# Patient Record
Sex: FEMALE | Race: BLACK OR AFRICAN AMERICAN | NOT HISPANIC OR LATINO | URBAN - METROPOLITAN AREA
[De-identification: names, ages, dates, MRNs, and addresses within clinical notes are randomized per-mention and may not be internally consistent; named-entity substitution may affect disease eponyms.]

---

## 2017-04-10 ENCOUNTER — INPATIENT (INPATIENT)
Facility: HOSPITAL | Age: 67
LOS: 1 days | Discharge: HOME | End: 2017-04-12
Attending: INTERNAL MEDICINE | Admitting: INTERNAL MEDICINE
Payer: MEDICAID

## 2017-04-10 VITALS
HEIGHT: 64 IN | TEMPERATURE: 97 F | HEART RATE: 87 BPM | RESPIRATION RATE: 17 BRPM | OXYGEN SATURATION: 100 % | SYSTOLIC BLOOD PRESSURE: 197 MMHG | WEIGHT: 216.05 LBS | DIASTOLIC BLOOD PRESSURE: 119 MMHG

## 2017-04-10 LAB
ALBUMIN SERPL ELPH-MCNC: 3.6 G/DL — SIGNIFICANT CHANGE UP (ref 3.3–5)
ALP SERPL-CCNC: 70 U/L — SIGNIFICANT CHANGE UP (ref 40–120)
ALT FLD-CCNC: 19 U/L — SIGNIFICANT CHANGE UP (ref 12–78)
ANION GAP SERPL CALC-SCNC: 8 MMOL/L — SIGNIFICANT CHANGE UP (ref 5–17)
APTT BLD: 33.7 SEC — SIGNIFICANT CHANGE UP (ref 27.5–37.4)
AST SERPL-CCNC: 19 U/L — SIGNIFICANT CHANGE UP (ref 15–37)
BASOPHILS # BLD AUTO: 0.1 K/UL — SIGNIFICANT CHANGE UP (ref 0–0.2)
BASOPHILS NFR BLD AUTO: 1 % — SIGNIFICANT CHANGE UP (ref 0–2)
BILIRUB SERPL-MCNC: 0.5 MG/DL — SIGNIFICANT CHANGE UP (ref 0.2–1.2)
BUN SERPL-MCNC: 12 MG/DL — SIGNIFICANT CHANGE UP (ref 7–23)
CALCIUM SERPL-MCNC: 8.1 MG/DL — LOW (ref 8.5–10.1)
CHLORIDE SERPL-SCNC: 102 MMOL/L — SIGNIFICANT CHANGE UP (ref 96–108)
CK MB CFR SERPL CALC: 2 NG/ML — SIGNIFICANT CHANGE UP (ref 0.5–3.6)
CK SERPL-CCNC: 147 U/L — SIGNIFICANT CHANGE UP (ref 26–192)
CO2 SERPL-SCNC: 30 MMOL/L — SIGNIFICANT CHANGE UP (ref 22–31)
CREAT SERPL-MCNC: 0.69 MG/DL — SIGNIFICANT CHANGE UP (ref 0.5–1.3)
EOSINOPHIL # BLD AUTO: 0 K/UL — SIGNIFICANT CHANGE UP (ref 0–0.5)
EOSINOPHIL NFR BLD AUTO: 0.6 % — SIGNIFICANT CHANGE UP (ref 0–6)
GLUCOSE SERPL-MCNC: 112 MG/DL — HIGH (ref 70–99)
HCT VFR BLD CALC: 40.6 % — SIGNIFICANT CHANGE UP (ref 34.5–45)
HGB BLD-MCNC: 13.1 G/DL — SIGNIFICANT CHANGE UP (ref 11.5–15.5)
INR BLD: 1.04 RATIO — SIGNIFICANT CHANGE UP (ref 0.88–1.16)
LYMPHOCYTES # BLD AUTO: 1.4 K/UL — SIGNIFICANT CHANGE UP (ref 1–3.3)
LYMPHOCYTES # BLD AUTO: 26.7 % — SIGNIFICANT CHANGE UP (ref 13–44)
MCHC RBC-ENTMCNC: 27.7 PG — SIGNIFICANT CHANGE UP (ref 27–34)
MCHC RBC-ENTMCNC: 32.1 GM/DL — SIGNIFICANT CHANGE UP (ref 32–36)
MCV RBC AUTO: 86.2 FL — SIGNIFICANT CHANGE UP (ref 80–100)
MONOCYTES # BLD AUTO: 0.3 K/UL — SIGNIFICANT CHANGE UP (ref 0–0.9)
MONOCYTES NFR BLD AUTO: 5.2 % — SIGNIFICANT CHANGE UP (ref 2–14)
NEUTROPHILS # BLD AUTO: 3.6 K/UL — SIGNIFICANT CHANGE UP (ref 1.8–7.4)
NEUTROPHILS NFR BLD AUTO: 66.5 % — SIGNIFICANT CHANGE UP (ref 43–77)
PLATELET # BLD AUTO: 225 K/UL — SIGNIFICANT CHANGE UP (ref 150–400)
POTASSIUM SERPL-MCNC: 3.9 MMOL/L — SIGNIFICANT CHANGE UP (ref 3.5–5.3)
POTASSIUM SERPL-SCNC: 3.9 MMOL/L — SIGNIFICANT CHANGE UP (ref 3.5–5.3)
PROT SERPL-MCNC: 7.4 GM/DL — SIGNIFICANT CHANGE UP (ref 6–8.3)
PROTHROM AB SERPL-ACNC: 11.3 SEC — SIGNIFICANT CHANGE UP (ref 9.8–12.7)
RBC # BLD: 4.71 M/UL — SIGNIFICANT CHANGE UP (ref 3.8–5.2)
RBC # FLD: 14 % — SIGNIFICANT CHANGE UP (ref 11–15)
SODIUM SERPL-SCNC: 140 MMOL/L — SIGNIFICANT CHANGE UP (ref 135–145)
TROPONIN I SERPL-MCNC: <.015 NG/ML — SIGNIFICANT CHANGE UP (ref 0.01–0.04)
WBC # BLD: 5.4 K/UL — SIGNIFICANT CHANGE UP (ref 3.8–10.5)
WBC # FLD AUTO: 5.4 K/UL — SIGNIFICANT CHANGE UP (ref 3.8–10.5)

## 2017-04-10 PROCEDURE — 71010: CPT | Mod: 26

## 2017-04-10 PROCEDURE — 99285 EMERGENCY DEPT VISIT HI MDM: CPT

## 2017-04-10 PROCEDURE — 74174 CTA ABD&PLVS W/CONTRAST: CPT | Mod: 26

## 2017-04-10 PROCEDURE — 70450 CT HEAD/BRAIN W/O DYE: CPT | Mod: 26

## 2017-04-10 RX ORDER — LABETALOL HCL 100 MG
10 TABLET ORAL ONCE
Qty: 0 | Refills: 0 | Status: COMPLETED | OUTPATIENT
Start: 2017-04-10 | End: 2017-04-10

## 2017-04-10 RX ORDER — ASPIRIN/CALCIUM CARB/MAGNESIUM 324 MG
325 TABLET ORAL ONCE
Qty: 0 | Refills: 0 | Status: COMPLETED | OUTPATIENT
Start: 2017-04-10 | End: 2017-04-10

## 2017-04-10 RX ADMIN — Medication 10 MILLIGRAM(S): at 22:25

## 2017-04-10 RX ADMIN — Medication 325 MILLIGRAM(S): at 22:25

## 2017-04-10 RX ADMIN — Medication 1 MILLIGRAM(S): at 22:25

## 2017-04-10 NOTE — ED ADULT NURSE NOTE - OBJECTIVE STATEMENT
pt is AxOx4; c/o chest pain and occasional difficulty breathing. her cousin passed away and pt 's got very upset. pt also has mild HA. denies any other symptoms

## 2017-04-10 NOTE — ED PROVIDER NOTE - MEDICAL DECISION MAKING DETAILS
Patient with chest pain, intermittent for the last week in the setting of recent stress.  BP improved in the ER.  Chest pain improved as well.  CXR, CTA studies are negative.  Based on risk factors, Heart Score 3, patient is to be admitted for ACS r/o.  Aspirin and labetalol given in ER.  Needs cards consult in am.  Patient is to be admitted to the hospital and the case was discussed with the admitting physician.  Any changes in plan, additional imaging/labs, and further work up will be at the discretion of the admitting physician.  Patient remained stable in my care.

## 2017-04-10 NOTE — ED PROVIDER NOTE - OBJECTIVE STATEMENT
Pertinent PMH/PSH/FHx/SHx and Review of Systems contained within:  Patient with history of HTN, HLD, and previous TIA's presents to the ED for chest pain.  Patient has been having intermittent chest pain for the last week after finding out that her cousin was ill. Today chest pain worsened after cousin passed away, "feels like someone is squeezing my neck" and has pain in jaw, "like I have a bad toothache".  Also complains of shortness of breath.  BP at home has been elevated.  Nonsmoker, no previous CAD.  Denies any calf pain or leg swelling.  Patient is limited historian, keeps eyes closed, and displaying distress.  AAOx3, normal insight, no S/H TIP, no command hallucinations.  Patient denies EtOH/tobacco/illicit substance use.      No fever/chills, No photophobia/eye pain/changes in vision, No ear pain/sore throat/dysphagia, No palpitations, no cough/wheeze/stridor, No abdominal pain, No N/V/D, no dysuria/frequency/discharge, No neck/back pain, no rash, no changes in neurological status/function.

## 2017-04-11 DIAGNOSIS — E03.9 HYPOTHYROIDISM, UNSPECIFIED: ICD-10-CM

## 2017-04-11 DIAGNOSIS — I10 ESSENTIAL (PRIMARY) HYPERTENSION: ICD-10-CM

## 2017-04-11 DIAGNOSIS — R07.9 CHEST PAIN, UNSPECIFIED: ICD-10-CM

## 2017-04-11 LAB
ANION GAP SERPL CALC-SCNC: 8 MMOL/L — SIGNIFICANT CHANGE UP (ref 5–17)
BASOPHILS # BLD AUTO: 0.1 K/UL — SIGNIFICANT CHANGE UP (ref 0–0.2)
BASOPHILS NFR BLD AUTO: 1.1 % — SIGNIFICANT CHANGE UP (ref 0–2)
BUN SERPL-MCNC: 14 MG/DL — SIGNIFICANT CHANGE UP (ref 7–23)
CALCIUM SERPL-MCNC: 8.3 MG/DL — LOW (ref 8.5–10.1)
CHLORIDE SERPL-SCNC: 106 MMOL/L — SIGNIFICANT CHANGE UP (ref 96–108)
CHOLEST SERPL-MCNC: 139 MG/DL — SIGNIFICANT CHANGE UP (ref 10–199)
CO2 SERPL-SCNC: 28 MMOL/L — SIGNIFICANT CHANGE UP (ref 22–31)
CREAT SERPL-MCNC: 0.73 MG/DL — SIGNIFICANT CHANGE UP (ref 0.5–1.3)
EOSINOPHIL # BLD AUTO: 0.1 K/UL — SIGNIFICANT CHANGE UP (ref 0–0.5)
EOSINOPHIL NFR BLD AUTO: 1.7 % — SIGNIFICANT CHANGE UP (ref 0–6)
GLUCOSE SERPL-MCNC: 96 MG/DL — SIGNIFICANT CHANGE UP (ref 70–99)
HBA1C BLD-MCNC: 5.3 % — SIGNIFICANT CHANGE UP (ref 4–5.6)
HCT VFR BLD CALC: 38.7 % — SIGNIFICANT CHANGE UP (ref 34.5–45)
HDLC SERPL-MCNC: 51 MG/DL — SIGNIFICANT CHANGE UP (ref 40–125)
HGB BLD-MCNC: 12.3 G/DL — SIGNIFICANT CHANGE UP (ref 11.5–15.5)
LIPID PNL WITH DIRECT LDL SERPL: 72 MG/DL — SIGNIFICANT CHANGE UP
LYMPHOCYTES # BLD AUTO: 1.9 K/UL — SIGNIFICANT CHANGE UP (ref 1–3.3)
LYMPHOCYTES # BLD AUTO: 38.5 % — SIGNIFICANT CHANGE UP (ref 13–44)
MCHC RBC-ENTMCNC: 27.5 PG — SIGNIFICANT CHANGE UP (ref 27–34)
MCHC RBC-ENTMCNC: 31.8 GM/DL — LOW (ref 32–36)
MCV RBC AUTO: 86.4 FL — SIGNIFICANT CHANGE UP (ref 80–100)
MONOCYTES # BLD AUTO: 0.3 K/UL — SIGNIFICANT CHANGE UP (ref 0–0.9)
MONOCYTES NFR BLD AUTO: 6.5 % — SIGNIFICANT CHANGE UP (ref 2–14)
NEUTROPHILS # BLD AUTO: 2.6 K/UL — SIGNIFICANT CHANGE UP (ref 1.8–7.4)
NEUTROPHILS NFR BLD AUTO: 52.2 % — SIGNIFICANT CHANGE UP (ref 43–77)
PLATELET # BLD AUTO: 222 K/UL — SIGNIFICANT CHANGE UP (ref 150–400)
POTASSIUM SERPL-MCNC: 3.4 MMOL/L — LOW (ref 3.5–5.3)
POTASSIUM SERPL-SCNC: 3.4 MMOL/L — LOW (ref 3.5–5.3)
RBC # BLD: 4.48 M/UL — SIGNIFICANT CHANGE UP (ref 3.8–5.2)
RBC # FLD: 14 % — SIGNIFICANT CHANGE UP (ref 11–15)
SODIUM SERPL-SCNC: 142 MMOL/L — SIGNIFICANT CHANGE UP (ref 135–145)
TOTAL CHOLESTEROL/HDL RATIO MEASUREMENT: 2.7 RATIO — LOW (ref 3.3–7.1)
TRIGL SERPL-MCNC: 78 MG/DL — SIGNIFICANT CHANGE UP (ref 10–149)
TROPONIN I SERPL-MCNC: <.015 NG/ML — SIGNIFICANT CHANGE UP (ref 0.01–0.04)
TROPONIN I SERPL-MCNC: <.015 NG/ML — SIGNIFICANT CHANGE UP (ref 0.01–0.04)
TSH SERPL-MCNC: 1.48 UU/ML — SIGNIFICANT CHANGE UP (ref 0.36–3.74)
WBC # BLD: 5 K/UL — SIGNIFICANT CHANGE UP (ref 3.8–10.5)
WBC # FLD AUTO: 5 K/UL — SIGNIFICANT CHANGE UP (ref 3.8–10.5)

## 2017-04-11 PROCEDURE — 71275 CT ANGIOGRAPHY CHEST: CPT | Mod: 26

## 2017-04-11 PROCEDURE — 99223 1ST HOSP IP/OBS HIGH 75: CPT

## 2017-04-11 RX ORDER — ASPIRIN/CALCIUM CARB/MAGNESIUM 324 MG
81 TABLET ORAL DAILY
Qty: 0 | Refills: 0 | Status: DISCONTINUED | OUTPATIENT
Start: 2017-04-11 | End: 2017-04-12

## 2017-04-11 RX ORDER — ALPRAZOLAM 0.25 MG
0.25 TABLET ORAL ONCE
Qty: 0 | Refills: 0 | Status: DISCONTINUED | OUTPATIENT
Start: 2017-04-11 | End: 2017-04-11

## 2017-04-11 RX ORDER — HEPARIN SODIUM 5000 [USP'U]/ML
5000 INJECTION INTRAVENOUS; SUBCUTANEOUS EVERY 12 HOURS
Qty: 0 | Refills: 0 | Status: DISCONTINUED | OUTPATIENT
Start: 2017-04-11 | End: 2017-04-12

## 2017-04-11 RX ORDER — POTASSIUM CHLORIDE 20 MEQ
40 PACKET (EA) ORAL ONCE
Qty: 0 | Refills: 0 | Status: COMPLETED | OUTPATIENT
Start: 2017-04-11 | End: 2017-04-11

## 2017-04-11 RX ORDER — METOPROLOL TARTRATE 50 MG
25 TABLET ORAL
Qty: 0 | Refills: 0 | Status: DISCONTINUED | OUTPATIENT
Start: 2017-04-11 | End: 2017-04-12

## 2017-04-11 RX ORDER — PANTOPRAZOLE SODIUM 20 MG/1
40 TABLET, DELAYED RELEASE ORAL
Qty: 0 | Refills: 0 | Status: DISCONTINUED | OUTPATIENT
Start: 2017-04-11 | End: 2017-04-12

## 2017-04-11 RX ADMIN — PANTOPRAZOLE SODIUM 40 MILLIGRAM(S): 20 TABLET, DELAYED RELEASE ORAL at 07:55

## 2017-04-11 RX ADMIN — Medication 25 MILLIGRAM(S): at 22:04

## 2017-04-11 RX ADMIN — Medication 40 MILLIEQUIVALENT(S): at 12:05

## 2017-04-11 RX ADMIN — HEPARIN SODIUM 5000 UNIT(S): 5000 INJECTION INTRAVENOUS; SUBCUTANEOUS at 22:04

## 2017-04-11 RX ADMIN — Medication 81 MILLIGRAM(S): at 12:05

## 2017-04-11 RX ADMIN — Medication 0.25 MILLIGRAM(S): at 12:05

## 2017-04-11 NOTE — H&P ADULT. - NEGATIVE CARDIOVASCULAR SYMPTOMS
no peripheral edema/no paroxysmal nocturnal dyspnea/no dyspnea on exertion/no palpitations/no orthopnea

## 2017-04-11 NOTE — H&P ADULT. - NEGATIVE MUSCULOSKELETAL SYMPTOMS
no muscle weakness/no arthritis/no arthralgia/no back pain/no myalgia/no neck pain/no joint swelling

## 2017-04-11 NOTE — H&P ADULT. - HISTORY OF PRESENT ILLNESS
65 y/o womant with history of HTN, HLD, hypothyroidism, and previous TIA's presents to the ED for chest pain.  Patient has been having intermittent chest pain for the last week after finding out that her cousin was ill. Today chest pain worsened after cousin passed away. Pain described as squeezing, substernal, mild to moderate intensity, non radiating, comes and goes, associated with shortness of breath.  BP at home has been elevated.  Nonsmoker, no previous CAD.  Denies any calf pain or leg swelling.  Patient is poor historian   No fever/chills, No photophobia/eye pain/changes in vision, No ear pain/sore throat/dysphagia, No palpitations, no cough/wheeze/stridor, No abdominal pain, No N/V/D, no dysuria/frequency/discharge, No neck/back pain, no rash, no changes in neurological status/function.

## 2017-04-11 NOTE — CONSULT NOTE ADULT - SUBJECTIVE AND OBJECTIVE BOX
Chief Complaint:  Patient is a 66y old  Female who presents with a chief complaint of Intermittent chest pain for a week. (2017 06:25)      HPI:  65 y/o womant with history of HTN, HLD, hypothyroidism, and previous TIA's presents to the ED for chest pain.  Patient has been having intermittent chest pain for the last week after finding out that her cousin was ill. Today chest pain worsened after cousin passed away. Pain described as squeezing, substernal, mild to moderate intensity, non radiating, comes and goes, associated with shortness of breath.  BP at home has been elevated.  Nonsmoker, no previous CAD.  Denies any calf pain or leg swelling.  Patient is poor historian   No fever/chills, No photophobia/eye pain/changes in vision, No ear pain/sore throat/dysphagia, No palpitations, no cough/wheeze/stridor, No abdominal pain, No N/V/D, no dysuria/frequency/discharge, No neck/back pain, no rash, no changes in neurological status/function. (2017 06:25)    Allergies:        Allergies:  	No Known Allergies:     * No Current Medications as of 10-Apr-2017 22:44 documented in Prescription Writer      Past Medical History:  HTN (hypertension)    Hypothyroidism.    Past Surgical History:  No significant past surgical history.    Family History:  No pertinent family history in first degree relatives.    Social History:  · Marital Status		  · Lives With	other relative	  · Notes	sister	    Substance Use History:  · Substance Use	never used	    Alcohol Use History:  · Have you ever consumed alcohol	never	    Tobacco Usage:  · Tobacco Usage: Never smoker	    Review of Systems:  General:  No wt loss, fevers, chills, night sweats  Eyes:  Good vision, no reported pain  ENT:  No sore throat, pain, runny nose, dysphagia  CV:  No palpitations hypo/hypertension; Positive for Chest Pain  Resp:  No dyspnea, cough, tachypnea, wheezing  GI:  No pain, nausea, vomiting, diarrhea, constipation  :  No pain, bleeding, incontinence, nocturia  Muscle:  No pain, weakness  Breast:  No pain, abscess, mass, discharge  Neuro:  No weakness, tingling, memory problems  Psych:  No fatigue, insomnia, mood problems, depression  Endocrine:  No polyuria, polydypsia, cold/heat intolerance  Heme:  No petechiae, ecchymosis, easy bruisability  Skin:  No rash, edema      Physical Exam:  Vital Signs:  Vital Signs Last 24 Hrs  T(C): 36.8, Max: 36.9 ( @ 09:14)  T(F): 98.2, Max: 98.4 ( @ 09:14)  HR: 70 (65 - 98)  BP: 120/64 (106/64 - 197/119)  RR: 16 (15 - 20)  SpO2: 97% (97% - 100%)  Daily Height in cm: 162.56 (10 Apr 2017 20:17)    Daily Weight in k.3 (2017 09:14)    Tele:  General:  Appears stated age, well-groomed, well-nourished, no distress  HEENT:  NC/AT, patent nares w/ pink mucosa, OP clear w/o lesions, EOMI, conjunctivae clear, no thyromegaly, no JVD  Chest:  Full & symmetric excursion, no increased effort, breath sounds clear  Cardiovascular:  Regular rhythm, S1, S2, no murmur/rub/S3/S4, no carotid/femoral/abdominal bruit, radial/pedal pulses 2+  Abdomen:  Soft, non-tender, non-distended, normoactive bowel sounds  Extremities:  no edema  Skin:  No rash/erythema. Skin is warm/dry  Musculoskeletal:  Full ROM in all joints w/o swelling/tenderness/effusion  Neuro/Psych:  Alert, oriented    Laboratory:                            12.3   5.0   )-----------( 222      ( 2017 08:01 )             38.7     04-11    142  |  106  |  14  ----------------------------<  96  3.4<L>   |  28  |  0.73    Ca    8.3<L>      2017 08:01    TPro  7.4  /  Alb  3.6  /  TBili  0.5  /  DBili  x   /  AST  19  /  ALT  19  /  AlkPhos  70  04-10      CARDIAC MARKERS ( 2017 08:01 )  <.015 ng/mL / x     / x     / x     / x      CARDIAC MARKERS ( 10 Apr 2017 22:41 )  <.015 ng/mL / x     / 147 U/L / x     / 2.0 ng/mL      CAPILLARY BLOOD GLUCOSE    LIVER FUNCTIONS - ( 10 Apr 2017 22:41 )  Alb: 3.6 g/dL / Pro: 7.4 gm/dL / ALK PHOS: 70 U/L / ALT: 19 U/L / AST: 19 U/L / GGT: x           PT/INR - ( 10 Apr 2017 22:41 )   PT: 11.3 sec;   INR: 1.04 ratio         PTT - ( 10 Apr 2017 22:41 )  PTT:33.7 sec      Imaging:    EXAM:  CT BRAIN                        PROCEDURE DATE:  04/10/2017   No acute intracranial bleeding, mass effect, or shift.    EXAM:  CT ANGIO ABD AND PELVIS IC                          EXAM:  CT ANGIO CHEST (W)AW IC                        PROCEDURE DATE:  2017     No evidence of an aortic dissection.      EXAM:  CHEST SINGLE VIEW                        PROCEDURE DATE:  04/10/2017  Clear  lungs.  No acute airspace disease suggested.      Assessment:65 y/o womant with history of HTN, HLD, hypothyroidism, and previous TIA's presents to the ED for chest pain.  Patient has been having intermittent chest pain for the last week after finding out that her cousin was ill. Today chest pain worsened after cousin passed away. Pain described as squeezing, substernal, mild to moderate intensity, non radiating, comes and goes, associated with shortness of breath.  BP at home has been elevated.  Nonsmoker, no previous CAD.  Denies any calf pain or leg swelling.  Patient is poor historian   No fever/chills, No photophobia/eye pain/changes in vision, No ear pain/sore throat/dysphagia, No palpitations, no cough/wheeze/stridor, No abdominal pain, No N/V/D, no dysuria/frequency/discharge, No neck/back pain, no rash, no changes in neurological status/function.       Plan:   Tele monitoring.  Con't with:  aspirin enteric coated 81milliGRAM(s) Oral daily  heparin  Injectable 5000Unit(s) SubCutaneous every 12 hours  pantoprazole    Tablet 40milliGRAM(s) Oral before breakfast  metoprolol 25milliGRAM(s) Oral two times a day    Check echo. f/u labs. supportive care managment.      Marino Das MD, FACC, FASE, FASNC, FACP  Director, Heart Failure Services  [facility]  , Department of Cardiology  [facility] Chief Complaint:  Patient is a 66y old  Female who presents with a chief complaint of Intermittent chest pain for a week. (2017 06:25)      HPI:  65 y/o woman with history of HTN, HLD, hypothyroidism, and previous TIA's, gastric bypass, presents to the ED for chest pain.  Patient has been having intermittent chest pain for the last week after finding out that her cousin was ill. Today chest pain worsened after cousin passed away. Pain described as squeezing, substernal, mild to moderate intensity, non radiating, comes and goes, associated with shortness of breath.  BP at home has been elevated.  Nonsmoker, no previous CAD.  Denies any calf pain or leg swelling.  Patient is poor historian   No fever/chills, No photophobia/eye pain/changes in vision, No ear pain/sore throat/dysphagia, No palpitations, no cough/wheeze/stridor, No abdominal pain, No N/V/D, no dysuria/frequency/discharge, No neck/back pain, no rash, no changes in neurological status/function. (2017 06:25)    Allergies:        Allergies:  	No Known Allergies:     * No Current Medications as of 10-Apr-2017 22:44 documented in Prescription Writer      Past Medical History:  HTN (hypertension)    Hypothyroidism.    Past Surgical History:  No significant past surgical history.    Family History:  No pertinent family history in first degree relatives.    Social History:  · Marital Status		  · Lives With	other relative	  · Notes	sister	    Substance Use History:  · Substance Use	never used	    Alcohol Use History:  · Have you ever consumed alcohol	never	    Tobacco Usage:  · Tobacco Usage: Never smoker	    Review of Systems:  General:  No wt loss, fevers, chills, night sweats  Eyes:  Good vision, no reported pain  ENT:  No sore throat, pain, runny nose, dysphagia  CV:  No palpitations hypo/hypertension; Positive for Chest Pain  Resp:  No dyspnea, cough, tachypnea, wheezing  GI:  No pain, nausea, vomiting, diarrhea, constipation  :  No pain, bleeding, incontinence, nocturia  Muscle:  No pain, weakness  Breast:  No pain, abscess, mass, discharge  Neuro:  No weakness, tingling, memory problems  Psych:  No fatigue, insomnia, mood problems, depression  Endocrine:  No polyuria, polydypsia, cold/heat intolerance  Heme:  No petechiae, ecchymosis, easy bruisability  Skin:  No rash, edema      Physical Exam:  Vital Signs:  Vital Signs Last 24 Hrs  T(C): 36.8, Max: 36.9 ( @ 09:14)  T(F): 98.2, Max: 98.4 ( @ 09:14)  HR: 70 (65 - 98)  BP: 120/64 (106/64 - 197/119)  RR: 16 (15 - 20)  SpO2: 97% (97% - 100%)  Daily Height in cm: 162.56 (10 Apr 2017 20:17)    Daily Weight in k.3 (2017 09:14)    Tele: SR  General:  Appears stated age, well-groomed, well-nourished, no distress  HEENT:  NC/AT, patent nares w/ pink mucosa, OP clear w/o lesions, EOMI, conjunctivae clear, no thyromegaly, no JVD  Chest:  Full & symmetric excursion, no increased effort, breath sounds clear  Cardiovascular:  Regular rhythm, S1, S2, no murmur/rub/S3/S4, radial/pedal pulses 2+  Abdomen:  Soft, non-tender, non-distended, normoactive bowel sounds  Extremities:  no edema  Skin:  No rash/erythema. Skin is warm/dry  Musculoskeletal:  Full ROM in all joints w/o swelling/tenderness/effusion  Neuro/Psych:  Alert, oriented    Laboratory:                            12.3   5.0   )-----------( 222      ( 2017 08:01 )             38.7     04-11    142  |  106  |  14  ----------------------------<  96  3.4<L>   |  28  |  0.73    Ca    8.3<L>      2017 08:01    TPro  7.4  /  Alb  3.6  /  TBili  0.5  /  DBili  x   /  AST  19  /  ALT  19  /  AlkPhos  70  04-10      CARDIAC MARKERS ( 2017 08:01 )  <.015 ng/mL / x     / x     / x     / x      CARDIAC MARKERS ( 10 Apr 2017 22:41 )  <.015 ng/mL / x     / 147 U/L / x     / 2.0 ng/mL      CAPILLARY BLOOD GLUCOSE    LIVER FUNCTIONS - ( 10 Apr 2017 22:41 )  Alb: 3.6 g/dL / Pro: 7.4 gm/dL / ALK PHOS: 70 U/L / ALT: 19 U/L / AST: 19 U/L / GGT: x           PT/INR - ( 10 Apr 2017 22:41 )   PT: 11.3 sec;   INR: 1.04 ratio         PTT - ( 10 Apr 2017 22:41 )  PTT:33.7 sec      Imaging:  ECG: SR, nonspecific ST T abnls.    EXAM:  CT BRAIN                        PROCEDURE DATE:  04/10/2017   No acute intracranial bleeding, mass effect, or shift.    EXAM:  CT ANGIO ABD AND PELVIS IC                          EXAM:  CT ANGIO CHEST (W)AW IC                        PROCEDURE DATE:  2017     No evidence of an aortic dissection.      EXAM:  CHEST SINGLE VIEW                        PROCEDURE DATE:  04/10/2017  Clear  lungs.  No acute airspace disease suggested.      Assessment:65 y/o womant with history of HTN, HLD, hypothyroidism, and previous TIA's presents to the ED for chest pain.  Patient has been having intermittent chest pain for the last week after finding out that her cousin was ill. Today chest pain worsened after cousin passed away. Pain described as squeezing, substernal, mild to moderate intensity, non radiating, comes and goes, associated with shortness of breath.  BP at home has been elevated.  Nonsmoker, no previous CAD.  Denies any calf pain or leg swelling.  Patient is poor historian   No fever/chills, No photophobia/eye pain/changes in vision, No ear pain/sore throat/dysphagia, No palpitations, no cough/wheeze/stridor, No abdominal pain, No N/V/D, no dysuria/frequency/discharge, No neck/back pain, no rash, no changes in neurological status/function. No clear evidence of myocardial ischemia.      Plan:   Tele monitoring.    Con't with:  aspirin enteric coated 81milliGRAM(s) Oral daily  heparin  Injectable 5000Unit(s) SubCutaneous every 12 hours  pantoprazole    Tablet 40milliGRAM(s) Oral before breakfast  metoprolol 25milliGRAM(s) Oral two times a day    Check echo. f/u labs. supportive care management.      Marino Das MD, FACC, FASE, FASNC, FACP  Director, Heart Failure Services  [facility]  , Department of Cardiology  [facility]

## 2017-04-11 NOTE — H&P ADULT. - RS GEN PE MLT RESP DETAILS PC
good air movement/airway patent/clear to auscultation bilaterally/no rhonchi/breath sounds equal/no rales/respirations non-labored/no wheezes

## 2017-04-11 NOTE — H&P ADULT. - ASSESSMENT
65 y/o woman with chest pain after illness in family; initially hypertensive in ED but responded to medication, pain free at present.  Doubt acute coronary syndrome, troponin negative at present with no acute changes.

## 2017-04-11 NOTE — H&P ADULT. - MUSCULOSKELETAL
details… detailed exam no calf tenderness/no joint warmth/normal strength/no joint erythema/ROM intact/no joint swelling

## 2017-04-11 NOTE — H&P ADULT. - NEGATIVE NEUROLOGICAL SYMPTOMS
no syncope/no weakness/no loss of sensation/no generalized seizures/no paresthesias/no headache/no confusion/no vertigo/no loss of consciousness/no transient paralysis

## 2017-04-12 VITALS
OXYGEN SATURATION: 97 % | SYSTOLIC BLOOD PRESSURE: 117 MMHG | HEART RATE: 57 BPM | DIASTOLIC BLOOD PRESSURE: 74 MMHG | TEMPERATURE: 97 F | RESPIRATION RATE: 16 BRPM

## 2017-04-12 LAB
ANION GAP SERPL CALC-SCNC: 7 MMOL/L — SIGNIFICANT CHANGE UP (ref 5–17)
BUN SERPL-MCNC: 14 MG/DL — SIGNIFICANT CHANGE UP (ref 7–23)
CALCIUM SERPL-MCNC: 8.1 MG/DL — LOW (ref 8.5–10.1)
CHLORIDE SERPL-SCNC: 110 MMOL/L — HIGH (ref 96–108)
CK MB BLD-MCNC: 1.3 % — SIGNIFICANT CHANGE UP (ref 0–3.5)
CK MB CFR SERPL CALC: 1.1 NG/ML — SIGNIFICANT CHANGE UP (ref 0.5–3.6)
CK SERPL-CCNC: 82 U/L — SIGNIFICANT CHANGE UP (ref 26–192)
CO2 SERPL-SCNC: 28 MMOL/L — SIGNIFICANT CHANGE UP (ref 22–31)
CREAT SERPL-MCNC: 0.64 MG/DL — SIGNIFICANT CHANGE UP (ref 0.5–1.3)
GLUCOSE SERPL-MCNC: 90 MG/DL — SIGNIFICANT CHANGE UP (ref 70–99)
HCT VFR BLD CALC: 35.9 % — SIGNIFICANT CHANGE UP (ref 34.5–45)
HGB BLD-MCNC: 12 G/DL — SIGNIFICANT CHANGE UP (ref 11.5–15.5)
MCHC RBC-ENTMCNC: 28.8 PG — SIGNIFICANT CHANGE UP (ref 27–34)
MCHC RBC-ENTMCNC: 33.6 GM/DL — SIGNIFICANT CHANGE UP (ref 32–36)
MCV RBC AUTO: 85.9 FL — SIGNIFICANT CHANGE UP (ref 80–100)
PLATELET # BLD AUTO: 192 K/UL — SIGNIFICANT CHANGE UP (ref 150–400)
POTASSIUM SERPL-MCNC: 4 MMOL/L — SIGNIFICANT CHANGE UP (ref 3.5–5.3)
POTASSIUM SERPL-SCNC: 4 MMOL/L — SIGNIFICANT CHANGE UP (ref 3.5–5.3)
RBC # BLD: 4.18 M/UL — SIGNIFICANT CHANGE UP (ref 3.8–5.2)
RBC # FLD: 13.8 % — SIGNIFICANT CHANGE UP (ref 11–15)
SODIUM SERPL-SCNC: 145 MMOL/L — SIGNIFICANT CHANGE UP (ref 135–145)
TROPONIN I SERPL-MCNC: <.015 NG/ML — SIGNIFICANT CHANGE UP (ref 0.01–0.04)
WBC # BLD: 4.3 K/UL — SIGNIFICANT CHANGE UP (ref 3.8–10.5)
WBC # FLD AUTO: 4.3 K/UL — SIGNIFICANT CHANGE UP (ref 3.8–10.5)

## 2017-04-12 PROCEDURE — 99239 HOSP IP/OBS DSCHRG MGMT >30: CPT

## 2017-04-12 RX ORDER — PANTOPRAZOLE SODIUM 20 MG/1
1 TABLET, DELAYED RELEASE ORAL
Qty: 30 | Refills: 0 | OUTPATIENT
Start: 2017-04-12 | End: 2017-05-12

## 2017-04-12 RX ORDER — METOPROLOL TARTRATE 50 MG
1 TABLET ORAL
Qty: 60 | Refills: 0
Start: 2017-04-12 | End: 2017-05-12

## 2017-04-12 RX ADMIN — PANTOPRAZOLE SODIUM 40 MILLIGRAM(S): 20 TABLET, DELAYED RELEASE ORAL at 06:29

## 2017-04-12 RX ADMIN — Medication 25 MILLIGRAM(S): at 11:08

## 2017-04-12 RX ADMIN — Medication 81 MILLIGRAM(S): at 11:08

## 2017-04-12 NOTE — DISCHARGE NOTE ADULT - MEDICATION SUMMARY - MEDICATIONS TO TAKE
I will START or STAY ON the medications listed below when I get home from the hospital:    metoprolol tartrate 25 mg oral tablet  -- 1 tab(s) by mouth 2 times a day  -- Indication: For HTN (hypertension)    pantoprazole 40 mg oral delayed release tablet  -- 1 tab(s) by mouth once a day (before a meal)  -- Indication: For GERD

## 2017-04-12 NOTE — DISCHARGE NOTE ADULT - CARE PLAN
Principal Discharge DX:	GERD (gastroesophageal reflux disease)  Goal:	resolving  Instructions for follow-up, activity and diet:	dash diet  Secondary Diagnosis:	Essential hypertension  Goal:	contolled

## 2017-04-12 NOTE — DISCHARGE NOTE ADULT - PATIENT PORTAL LINK FT
“You can access the [facility] Patient Portal, offered by [facility], by registering with the following website: [email or web address]”

## 2017-04-12 NOTE — DISCHARGE NOTE ADULT - HOSPITAL COURSE
65 y/o woman with history of HTN, HLD, hypothyroidism, and previous TIA's, gastric bypass, presents to the ED for chest pain.  Patient has been having intermittent chest pain for the last week after finding out that her cousin was ill. Today chest pain worsened after cousin passed away. Pain described as squeezing, substernal, mild to moderate intensity, non radiating, comes and goes, associated with shortness of breath.  BP at home has been elevated.  Nonsmoker, no previous CAD.  Denies any calf pain or leg swelling.  Patient is poor historian   No fever/chills, No photophobia/eye pain/changes in vision, No ear pain/sore throat/dysphagia, No palpitations, no cough/wheeze/stridor, No abdominal pain, No N/V/D, no dysuria/frequency/discharge, No neck/back pain, no rash, no changes in neurological status/function. (11 Apr 2017 06:25)  patient was admitted  and monitored. She had normal cardiac enzymes. TTE was normal. She was seen by dr walsh, cardiologist and cleared for discharge

## 2017-04-14 DIAGNOSIS — R07.9 CHEST PAIN, UNSPECIFIED: ICD-10-CM

## 2017-04-14 DIAGNOSIS — Z86.73 PERSONAL HISTORY OF TRANSIENT ISCHEMIC ATTACK (TIA), AND CEREBRAL INFARCTION WITHOUT RESIDUAL DEFICITS: ICD-10-CM

## 2017-04-14 DIAGNOSIS — I10 ESSENTIAL (PRIMARY) HYPERTENSION: ICD-10-CM

## 2017-04-14 DIAGNOSIS — K21.9 GASTRO-ESOPHAGEAL REFLUX DISEASE WITHOUT ESOPHAGITIS: ICD-10-CM

## 2017-04-14 DIAGNOSIS — E03.9 HYPOTHYROIDISM, UNSPECIFIED: ICD-10-CM

## 2017-04-14 DIAGNOSIS — Z98.84 BARIATRIC SURGERY STATUS: ICD-10-CM

## 2017-04-14 DIAGNOSIS — E78.5 HYPERLIPIDEMIA, UNSPECIFIED: ICD-10-CM

## 2020-06-10 ENCOUNTER — EMERGENCY (EMERGENCY)
Facility: HOSPITAL | Age: 70
LOS: 1 days | Discharge: ANOTHER HOSPITAL | End: 2020-06-10
Attending: EMERGENCY MEDICINE | Admitting: EMERGENCY MEDICINE
Payer: MEDICARE

## 2020-06-10 VITALS
DIASTOLIC BLOOD PRESSURE: 125 MMHG | OXYGEN SATURATION: 100 % | SYSTOLIC BLOOD PRESSURE: 200 MMHG | TEMPERATURE: 99 F | HEART RATE: 93 BPM

## 2020-06-10 DIAGNOSIS — Z98.84 BARIATRIC SURGERY STATUS: Chronic | ICD-10-CM

## 2020-06-10 PROCEDURE — 99285 EMERGENCY DEPT VISIT HI MDM: CPT | Mod: GC

## 2020-06-10 RX ORDER — SODIUM CHLORIDE 9 MG/ML
1000 INJECTION INTRAMUSCULAR; INTRAVENOUS; SUBCUTANEOUS ONCE
Refills: 0 | Status: COMPLETED | OUTPATIENT
Start: 2020-06-10 | End: 2020-06-10

## 2020-06-10 RX ORDER — ONDANSETRON 8 MG/1
4 TABLET, FILM COATED ORAL ONCE
Refills: 0 | Status: COMPLETED | OUTPATIENT
Start: 2020-06-10 | End: 2020-06-10

## 2020-06-10 RX ORDER — KETOROLAC TROMETHAMINE 30 MG/ML
15 SYRINGE (ML) INJECTION ONCE
Refills: 0 | Status: DISCONTINUED | OUTPATIENT
Start: 2020-06-10 | End: 2020-06-10

## 2020-06-10 RX ADMIN — SODIUM CHLORIDE 1000 MILLILITER(S): 9 INJECTION INTRAMUSCULAR; INTRAVENOUS; SUBCUTANEOUS at 23:12

## 2020-06-10 RX ADMIN — ONDANSETRON 4 MILLIGRAM(S): 8 TABLET, FILM COATED ORAL at 23:12

## 2020-06-10 RX ADMIN — Medication 15 MILLIGRAM(S): at 23:21

## 2020-06-10 NOTE — ED PROVIDER NOTE - CLINICAL SUMMARY MEDICAL DECISION MAKING FREE TEXT BOX
Aramis Capellan MD. 69 F PMHx htn, s/p gastric bypass 10 years ago, prsents to ED for RUQ abd pain today after eating. pain waxes and wanes in intensity. also c/o nausea and spitting up but denies vomiting, diarrhea, fevers. last passed flatus this AM and last had BM this AM. pt w/ soft abd, tender to RUQ w/ +rothman's sign. will obtain labs, US, CT, ivf, analgesia, reassess.

## 2020-06-10 NOTE — ED PROVIDER NOTE - PROGRESS NOTE DETAILS
Aramis Capellan MD. pt reassessed and reevaluated. pending CT read. states abd pain has resolved after medications. does not feel nauseous. has not vomited. abd is soft, NT, ND. Aramis Capellan MD. pt still w/o pain in abd. abd is soft nt/nd. states she passed flatus while in the ED. pending CT read DH: Patient sign out received from Dr. Capellan. Patient stable, pending sx cs given findings of SBO on CT. Patient w/ prior unknown gastric bypass ~10 years ago and will defer NGT for now. D/w sx who will see patient soon. DH: Patient seen and evaluated. Well appearing w/o complaints. Surgery saw patient and d/w their attending who agrees patient to be transferred to [facility] for definitive care given prior gastric bypass. Will contact transfer center.

## 2020-06-10 NOTE — ED PROVIDER NOTE - PHYSICAL EXAMINATION
PHYSICAL EXAM:  GENERAL: non-toxic appearing; in no respiratory distress  HEAD: Atraumatic, Normocephalic;  NECK: No JVD; FROM  EYES: PERRL, EOMs intact b/l w/out deficits  CHEST/LUNG: CTAB no wheezes/rhonchi/rales  HEART: RRR no murmur/gallops/rubs  ABDOMEN: +BS, soft, obese, tender to epigastric and RUQ w/ +rothman's sign;   EXTREMITIES: No LE edema, +2 radial pulses b/l  MUSCULOSKELETAL: FROM of all 4 extremities   NERVOUS SYSTEM:  A&Ox3, No motor deficits or sensory deficits; CNII-XII intact; no focal neurologic deficits  SKIN:  No new rashes

## 2020-06-10 NOTE — ED ADULT TRIAGE NOTE - CHIEF COMPLAINT QUOTE
pt brought by WC to triage c/o abdm pain associated w/ nausea/vomiting described as "twisting" on and off pain x past few hours, last able to tolerate PO 8 hours ago, Hx HTN states took meds as directed this morning, states "slight" HA began with abdm pain

## 2020-06-10 NOTE — ED ADULT NURSE NOTE - OBJECTIVE STATEMENT
pt a&o x3 c/o epigastric pain with tenderness upon palpation. accompanied by n/v and chills. hx htn and reports compliance with meds. md assessed. left ac 20g placed. nad noted. safety maintained

## 2020-06-10 NOTE — ED PROVIDER NOTE - ATTENDING CONTRIBUTION TO CARE
Dr. Thornton:  I have personally performed a face to face bedside history and physical examination of this patient. I have discussed the history, examination, review of systems, assessment and plan of management with the resident. I have reviewed the electronic medical record and amended it to reflect my history, review of systems, physical exam, assessment and plan.    69F h/o gastric bypass, htn, presents with 1 day of abdominal pain shortly after eating a meal.  Pain mostly RUQ/epigastric area.  +Nausea.   Denies fevers/chills, cp, sob, diarrhea, urinary symptoms.    Exam;  - nad  - rrr   -ctab   -abd soft, RUQ TTP    A/P  - suspect cholecystitis, r/o SBO  - cbc, cmp, lipase, vbg, CT abd/pelvis, UA

## 2020-06-10 NOTE — ED ADULT NURSE NOTE - NSIMPLEMENTINTERV_GEN_ALL_ED
Implemented All Universal Safety Interventions:  [place] to call system. Call bell, personal items and telephone within reach. Instruct patient to call for assistance. Room bathroom lighting operational. Non-slip footwear when patient is off stretcher. Physically safe environment: no spills, clutter or unnecessary equipment. Stretcher in lowest position, wheels locked, appropriate side rails in place.

## 2020-06-11 ENCOUNTER — INPATIENT (INPATIENT)
Facility: HOSPITAL | Age: 70
LOS: 1 days | Discharge: HOME | DRG: 392 | End: 2020-06-13
Attending: SURGERY | Admitting: SURGERY
Payer: COMMERCIAL

## 2020-06-11 VITALS
OXYGEN SATURATION: 97 % | RESPIRATION RATE: 16 BRPM | HEART RATE: 78 BPM | SYSTOLIC BLOOD PRESSURE: 170 MMHG | TEMPERATURE: 98 F | DIASTOLIC BLOOD PRESSURE: 91 MMHG

## 2020-06-11 VITALS
SYSTOLIC BLOOD PRESSURE: 146 MMHG | DIASTOLIC BLOOD PRESSURE: 83 MMHG | HEART RATE: 76 BPM | RESPIRATION RATE: 18 BRPM | WEIGHT: 207.9 LBS | HEIGHT: 62 IN | OXYGEN SATURATION: 97 % | TEMPERATURE: 98 F

## 2020-06-11 DIAGNOSIS — K56.609 UNSPECIFIED INTESTINAL OBSTRUCTION, UNSPECIFIED AS TO PARTIAL VERSUS COMPLETE OBSTRUCTION: ICD-10-CM

## 2020-06-11 DIAGNOSIS — Z98.84 BARIATRIC SURGERY STATUS: Chronic | ICD-10-CM

## 2020-06-11 LAB
ALBUMIN SERPL ELPH-MCNC: 4 G/DL — SIGNIFICANT CHANGE UP (ref 3.3–5)
ALP SERPL-CCNC: 68 U/L — SIGNIFICANT CHANGE UP (ref 40–120)
ALT FLD-CCNC: 13 U/L — SIGNIFICANT CHANGE UP (ref 4–33)
ANION GAP SERPL CALC-SCNC: 11 MMO/L — SIGNIFICANT CHANGE UP (ref 7–14)
APPEARANCE UR: CLEAR — SIGNIFICANT CHANGE UP
AST SERPL-CCNC: 36 U/L — HIGH (ref 4–32)
BASE EXCESS BLDV CALC-SCNC: 3.3 MMOL/L — SIGNIFICANT CHANGE UP
BASOPHILS # BLD AUTO: 0.02 K/UL — SIGNIFICANT CHANGE UP (ref 0–0.2)
BASOPHILS NFR BLD AUTO: 0.3 % — SIGNIFICANT CHANGE UP (ref 0–2)
BILIRUB SERPL-MCNC: 0.3 MG/DL — SIGNIFICANT CHANGE UP (ref 0.2–1.2)
BILIRUB UR-MCNC: NEGATIVE — SIGNIFICANT CHANGE UP
BLD GP AB SCN SERPL QL: NEGATIVE — SIGNIFICANT CHANGE UP
BLOOD GAS VENOUS - CREATININE: 0.61 MG/DL — SIGNIFICANT CHANGE UP (ref 0.5–1.3)
BLOOD UR QL VISUAL: NEGATIVE — SIGNIFICANT CHANGE UP
BUN SERPL-MCNC: 10 MG/DL — SIGNIFICANT CHANGE UP (ref 7–23)
CALCIUM SERPL-MCNC: 9.1 MG/DL — SIGNIFICANT CHANGE UP (ref 8.4–10.5)
CHLORIDE BLDV-SCNC: 105 MMOL/L — SIGNIFICANT CHANGE UP (ref 96–108)
CHLORIDE SERPL-SCNC: 102 MMOL/L — SIGNIFICANT CHANGE UP (ref 98–107)
CO2 SERPL-SCNC: 24 MMOL/L — SIGNIFICANT CHANGE UP (ref 22–31)
COLOR SPEC: COLORLESS — SIGNIFICANT CHANGE UP
CREAT SERPL-MCNC: 0.59 MG/DL — SIGNIFICANT CHANGE UP (ref 0.5–1.3)
CULTURE RESULTS: SIGNIFICANT CHANGE UP
EOSINOPHIL # BLD AUTO: 0.03 K/UL — SIGNIFICANT CHANGE UP (ref 0–0.5)
EOSINOPHIL NFR BLD AUTO: 0.5 % — SIGNIFICANT CHANGE UP (ref 0–6)
GAS PNL BLDV: 138 MMOL/L — SIGNIFICANT CHANGE UP (ref 136–146)
GLUCOSE BLDV-MCNC: 111 MG/DL — HIGH (ref 70–99)
GLUCOSE SERPL-MCNC: 112 MG/DL — HIGH (ref 70–99)
GLUCOSE UR-MCNC: NEGATIVE — SIGNIFICANT CHANGE UP
HCO3 BLDV-SCNC: 25 MMOL/L — SIGNIFICANT CHANGE UP (ref 20–27)
HCT VFR BLD CALC: 42.6 % — SIGNIFICANT CHANGE UP (ref 34.5–45)
HCT VFR BLDV CALC: 41.6 % — SIGNIFICANT CHANGE UP (ref 34.5–45)
HGB BLD-MCNC: 12.9 G/DL — SIGNIFICANT CHANGE UP (ref 11.5–15.5)
HGB BLDV-MCNC: 13.6 G/DL — SIGNIFICANT CHANGE UP (ref 11.5–15.5)
IMM GRANULOCYTES NFR BLD AUTO: 0.3 % — SIGNIFICANT CHANGE UP (ref 0–1.5)
KETONES UR-MCNC: NEGATIVE — SIGNIFICANT CHANGE UP
LACTATE BLDV-MCNC: 1 MMOL/L — SIGNIFICANT CHANGE UP (ref 0.5–2)
LEUKOCYTE ESTERASE UR-ACNC: NEGATIVE — SIGNIFICANT CHANGE UP
LIDOCAIN IGE QN: 32.9 U/L — SIGNIFICANT CHANGE UP (ref 7–60)
LYMPHOCYTES # BLD AUTO: 1.02 K/UL — SIGNIFICANT CHANGE UP (ref 1–3.3)
LYMPHOCYTES # BLD AUTO: 17.4 % — SIGNIFICANT CHANGE UP (ref 13–44)
MCHC RBC-ENTMCNC: 26.9 PG — LOW (ref 27–34)
MCHC RBC-ENTMCNC: 30.3 % — LOW (ref 32–36)
MCV RBC AUTO: 88.8 FL — SIGNIFICANT CHANGE UP (ref 80–100)
MONOCYTES # BLD AUTO: 0.25 K/UL — SIGNIFICANT CHANGE UP (ref 0–0.9)
MONOCYTES NFR BLD AUTO: 4.3 % — SIGNIFICANT CHANGE UP (ref 2–14)
NEUTROPHILS # BLD AUTO: 4.51 K/UL — SIGNIFICANT CHANGE UP (ref 1.8–7.4)
NEUTROPHILS NFR BLD AUTO: 77.2 % — HIGH (ref 43–77)
NITRITE UR-MCNC: NEGATIVE — SIGNIFICANT CHANGE UP
NRBC # FLD: 0 K/UL — SIGNIFICANT CHANGE UP (ref 0–0)
PCO2 BLDV: 56 MMHG — HIGH (ref 41–51)
PH BLDV: 7.33 PH — SIGNIFICANT CHANGE UP (ref 7.32–7.43)
PH UR: 6.5 — SIGNIFICANT CHANGE UP (ref 5–8)
PLATELET # BLD AUTO: 253 K/UL — SIGNIFICANT CHANGE UP (ref 150–400)
PMV BLD: 9.6 FL — SIGNIFICANT CHANGE UP (ref 7–13)
PO2 BLDV: 28 MMHG — LOW (ref 35–40)
POTASSIUM BLDV-SCNC: 3.8 MMOL/L — SIGNIFICANT CHANGE UP (ref 3.4–4.5)
POTASSIUM SERPL-MCNC: 5.1 MMOL/L — SIGNIFICANT CHANGE UP (ref 3.5–5.3)
POTASSIUM SERPL-SCNC: 5.1 MMOL/L — SIGNIFICANT CHANGE UP (ref 3.5–5.3)
PROT SERPL-MCNC: 7 G/DL — SIGNIFICANT CHANGE UP (ref 6–8.3)
PROT UR-MCNC: NEGATIVE — SIGNIFICANT CHANGE UP
RBC # BLD: 4.8 M/UL — SIGNIFICANT CHANGE UP (ref 3.8–5.2)
RBC # FLD: 13.3 % — SIGNIFICANT CHANGE UP (ref 10.3–14.5)
RH IG SCN BLD-IMP: NEGATIVE — SIGNIFICANT CHANGE UP
SAO2 % BLDV: 42.3 % — LOW (ref 60–85)
SARS-COV-2 RNA SPEC QL NAA+PROBE: SIGNIFICANT CHANGE UP
SARS-COV-2 RNA SPEC QL NAA+PROBE: SIGNIFICANT CHANGE UP
SODIUM SERPL-SCNC: 137 MMOL/L — SIGNIFICANT CHANGE UP (ref 135–145)
SP GR SPEC: 1.01 — SIGNIFICANT CHANGE UP (ref 1–1.04)
SPECIMEN SOURCE: SIGNIFICANT CHANGE UP
UROBILINOGEN FLD QL: NORMAL — SIGNIFICANT CHANGE UP
WBC # BLD: 5.85 K/UL — SIGNIFICANT CHANGE UP (ref 3.8–10.5)
WBC # FLD AUTO: 5.85 K/UL — SIGNIFICANT CHANGE UP (ref 3.8–10.5)

## 2020-06-11 PROCEDURE — 74176 CT ABD & PELVIS W/O CONTRAST: CPT | Mod: 26

## 2020-06-11 PROCEDURE — 99285 EMERGENCY DEPT VISIT HI MDM: CPT

## 2020-06-11 PROCEDURE — 74177 CT ABD & PELVIS W/CONTRAST: CPT | Mod: 26

## 2020-06-11 PROCEDURE — 76705 ECHO EXAM OF ABDOMEN: CPT | Mod: 26

## 2020-06-11 RX ORDER — SODIUM CHLORIDE 9 MG/ML
1000 INJECTION, SOLUTION INTRAVENOUS
Refills: 0 | Status: DISCONTINUED | OUTPATIENT
Start: 2020-06-11 | End: 2020-06-14

## 2020-06-11 RX ORDER — METOPROLOL TARTRATE 50 MG
5 TABLET ORAL EVERY 6 HOURS
Refills: 0 | Status: DISCONTINUED | OUTPATIENT
Start: 2020-06-11 | End: 2020-06-13

## 2020-06-11 RX ORDER — SODIUM CHLORIDE 9 MG/ML
1000 INJECTION, SOLUTION INTRAVENOUS
Refills: 0 | Status: DISCONTINUED | OUTPATIENT
Start: 2020-06-11 | End: 2020-06-11

## 2020-06-11 RX ORDER — PANTOPRAZOLE SODIUM 20 MG/1
40 TABLET, DELAYED RELEASE ORAL DAILY
Refills: 0 | Status: DISCONTINUED | OUTPATIENT
Start: 2020-06-11 | End: 2020-06-13

## 2020-06-11 RX ORDER — ENOXAPARIN SODIUM 100 MG/ML
40 INJECTION SUBCUTANEOUS DAILY
Refills: 0 | Status: DISCONTINUED | OUTPATIENT
Start: 2020-06-11 | End: 2020-06-13

## 2020-06-11 RX ORDER — HYDRALAZINE HCL 50 MG
10 TABLET ORAL ONCE
Refills: 0 | Status: COMPLETED | OUTPATIENT
Start: 2020-06-11 | End: 2020-06-11

## 2020-06-11 RX ORDER — SODIUM CHLORIDE 9 MG/ML
1000 INJECTION, SOLUTION INTRAVENOUS
Refills: 0 | Status: DISCONTINUED | OUTPATIENT
Start: 2020-06-11 | End: 2020-06-12

## 2020-06-11 RX ORDER — ACETAMINOPHEN 500 MG
1000 TABLET ORAL ONCE
Refills: 0 | Status: COMPLETED | OUTPATIENT
Start: 2020-06-11 | End: 2020-06-11

## 2020-06-11 RX ADMIN — SODIUM CHLORIDE 125 MILLILITER(S): 9 INJECTION, SOLUTION INTRAVENOUS at 10:00

## 2020-06-11 RX ADMIN — Medication 10 MILLIGRAM(S): at 21:30

## 2020-06-11 RX ADMIN — Medication 5 MILLIGRAM(S): at 17:31

## 2020-06-11 RX ADMIN — SODIUM CHLORIDE 125 MILLILITER(S): 9 INJECTION, SOLUTION INTRAVENOUS at 18:33

## 2020-06-11 RX ADMIN — SODIUM CHLORIDE 125 MILLILITER(S): 9 INJECTION, SOLUTION INTRAVENOUS at 15:47

## 2020-06-11 RX ADMIN — Medication 400 MILLIGRAM(S): at 20:10

## 2020-06-11 NOTE — ED PROVIDER NOTE - OBJECTIVE STATEMENT
70yo f pmhx RYGB > 10 years ago, xfer from [facility] for sbo. origninally pw 1day abd pain. per [facility] chart, . Abdominal tenderness to palpation, without peritoneal signs. Labs WNL, normal lactate. CT scan suggestive of an SBO with transition point distal to the J-J anastomosis. 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    at the [facility] ED- received   toradol   zofran 4mg   1lns bolus  banana bag

## 2020-06-11 NOTE — ED PROVIDER NOTE - NS ED ROS FT
ROS:  GENERAL: No fever, no chills  EYES: no change in vision  HEENT: no trouble swallowing, no trouble speaking  CARDIAC: no chest pain  PULMONARY: no cough, no shortness of breath  GI: + abdominal pain, + nausea, no vomiting, no diarrhea, no constipation  : No dysuria, no frequency, no change in appearance, or odor of urine  SKIN: no rashes  NEURO: no headache, no weakness  MSK: No joint pain  ~Mj Worrell D.O. -Resident

## 2020-06-11 NOTE — ED PROVIDER NOTE - PROGRESS NOTE DETAILS
spoke with bariatrics np, surgical attending requesting ct abd w/ po contrast to better visualize bowel, will obtain ct and followup with surgery patient re-evaluated and doing well.  No acute issues at  this time. pending remaining imaging.

## 2020-06-11 NOTE — PROVIDER CONTACT NOTE (OTHER) - ASSESSMENT
Pt AOx4, c/o headache. Denies any blurred vision or lightheadedness. /101, other VSS. Pt denies any chest pain

## 2020-06-11 NOTE — CONSULT NOTE ADULT - SUBJECTIVE AND OBJECTIVE BOX
GENERAL SURGERY CONSULT NOTE  --------------------------------------------------------------------------------------------    Consulting Attending: Dr. ALL Hart    HISTORY OF PRESENT ILLNESS:  Kate Fu is a 69 year old F with a pmhx of HTN, and pshx a RYGB + cholecystectomy for weight loss over 10 years ago at an OSH who presents with a 1-day history of abdominal pain. The patient reports waxing and waning symptoms over the last year that have all been self-limited. However, yesterday's episode failed to improve despite usual measures of limiting oral intake, and inducing BMs with valsalva/straining. The patient's symptoms began yesterday morning.      PAST MEDICAL & SURGICAL HISTORY:  Hypertension  H/O gastric bypass    FAMILY HISTORY:  [] Family history not pertinent as reviewed with the patient and family    SOCIAL HISTORY:  ***    ALLERGIES: No Known Allergies      HOME MEDICATIONS: ***    CURRENT MEDICATIONS  MEDICATIONS (STANDING):   MEDICATIONS (PRN):  --------------------------------------------------------------------------------------------    Vitals:   T(C): 37.3 (06-10-20 @ 22:28), Max: 37.3 (06-10-20 @ 22:28)  HR: 88 (20 @ 06:28) (88 - 99)  BP: 161/99 (20 @ 06:28) (161/99 - 217/122)  RR: 16 (20 @ 06:28) (16 - 20)  SpO2: 100% (20 @ 06:28) (99% - 100%)  CAPILLARY BLOOD GLUCOSE                PHYSICAL EXAM: ***  General: NAD, Lying in bed comfortably  Neuro: A+Ox3  HEENT: NC/AT, EOMI  Neck: Soft, supple  Cardio: RRR, nml S1/S2  Resp: Good effort, CTA b/l  Thorax: No chest wall tenderness  Breast: No lesions/masses, no drainage  GI/Abd: Soft, NT/ND, no rebound/guarding, no masses palpated  Vascular: All 4 extremities warm.  Skin: Intact, no breakdown  Lymphatic/Nodes: No palpable lymphadenopathy  Musculoskeletal: All 4 extremities moving spontaneously, no limitations  --------------------------------------------------------------------------------------------    LABS  CBC ( @ 00:30)                              12.9                           5.85    )----------------(  253        77.2<H>% Neutrophils, 17.4  % Lymphocytes, ANC: 4.51                                42.6      BMP ( @ 00:30)             137     |  102     |  10    		Ca++ --      Ca 9.1                ---------------------------------( 112<H>		Mg --                 5.1     |  24      |  0.59  			Ph --        LFTs ( @ 00:30)      TPro 7.0 / Alb 4.0 / TBili 0.3 / DBili -- / AST 36<H> / ALT 13 / AlkPhos 68          VBG ( @ 00:30)     7.33 / 56<H> / 28<L> / 25 / 3.3 / 42.3<L>%     Lactate: 1.0    --------------------------------------------------------------------------------------------    MICROBIOLOGY  Urinalysis ( @ 00:30):     Color: COLORLESS / Appearance: CLEAR / S.009 / pH: 6.5 / Gluc: NEGATIVE / Ketones: NEGATIVE / Bili: NEGATIVE / Urobili: NORMAL / Protein :NEGATIVE / Nitrites: NEGATIVE / Leuk.Est: NEGATIVE / RBC:  / WBC:  / Sq Epi:  / Non Sq Epi:  / Bacteria          --------------------------------------------------------------------------------------------    IMAGING  ***    --------------------------------------------------------------------------------------------    ASSESSMENT: Patient is a 69yf pmhx ***    PLAN:  ***  -   -   -   -   - Patient seen/examined with attending.  - Plan to be discussed with Attending,  GENERAL SURGERY CONSULT NOTE  --------------------------------------------------------------------------------------------    Consulting Attending: Dr. ALL Hart    HISTORY OF PRESENT ILLNESS:  Kate Fu is a 69 year old F with a pmhx of HTN, and pshx a RYGB + cholecystectomy for weight loss over 10 years ago at an OSH who presents with a 1-day history of abdominal pain. The patient reports waxing and waning symptoms over the last year that have all been self-limited. This episode began yesterday morning and failed to improve despite usual measures of limiting oral intake, and inducing BMs with valsalva/straining. She describes a gnawing, crampy pain in the mid-epigastric and RUQ, but intermittently diffuse. The patient report some nausea but without vomiting; episodes of retching produced scant sputum. She denies any associated fevers, chills, dyspnea or localizing signs. Her last BM was yesterday morning around 0900, and her last flatus was this AM around 0700 shortly after her CT scan.     She has never had an interval upper endoscopy since her RYGB, denies a ulcer history and reports a colonoscopy "a few years ago" (unsure of date) which was reportedly normal.     PAST MEDICAL & SURGICAL HISTORY:  Hypertension  H/O gastric bypass    FAMILY HISTORY:  [x] Family history not pertinent as reviewed with the patient and family    SOCIAL HISTORY: Never smoker, occasional EtOH use usually in a social setting, active lifestyle.    ALLERGIES: No Known Allergies    CURRENT MEDICATIONS  MEDICATIONS (STANDING):   MEDICATIONS (PRN):  --------------------------------------------------------------------------------------------    Vitals:   T(C): 37.3 (06-10-20 @ 22:28), Max: 37.3 (06-10-20 @ 22:28)  HR: 88 (20 @ 06:28) (88 - 99)  BP: 161/99 (20 @ 06:28) (161/99 - 217/122)  RR: 16 (20 @ 06:28) (16 - 20)  SpO2: 100% (20 @ :28) (99% - 100%)  CAPILLARY BLOOD GLUCOSE      PHYSICAL EXAM:  General: NAD, Lying in bed comfortably  Neuro: A+Ox3  HEENT: NC/AT, EOMI, anicteric sclerae  Cardio: RRR, nml S1/S2  Resp: Good effort, CTA b/l  GI/Abd: Soft, right-sided abdominal tenderness, mild soreness in the periumbilical region, no rebound/guarding, no masses palpated  Vascular: All 4 extremities warm.  Skin: Intact, no breakdown  Musculoskeletal: All 4 extremities moving spontaneously, no limitations  --------------------------------------------------------------------------------------------    LABS  CBC ( @ 00:30)                              12.9                           5.85    )----------------(  253        77.2<H>% Neutrophils, 17.4  % Lymphocytes, ANC: 4.51                                42.6      BMP ( @ 00:30)             137     |  102     |  10    		Ca++ --      Ca 9.1                ---------------------------------( 112<H>		Mg --                 5.1     |  24      |  0.59  			Ph --        LFTs ( @ 00:30)      TPro 7.0 / Alb 4.0 / TBili 0.3 / DBili -- / AST 36<H> / ALT 13 / AlkPhos 68          VBG ( @ 00:30)     7.33 / 56<H> / 28<L> / 25 / 3.3 / 42.3<L>%     Lactate: 1.0    --------------------------------------------------------------------------------------------    MICROBIOLOGY  Urinalysis ( @ 00:30):     Color: COLORLESS / Appearance: CLEAR / S.009 / pH: 6.5 / Gluc: NEGATIVE / Ketones: NEGATIVE / Bili: NEGATIVE / Urobili: NORMAL / Protein :NEGATIVE / Nitrites: NEGATIVE / Leuk.Est: NEGATIVE / RBC:  / WBC:  / Sq Epi:  / Non Sq Epi:  / Bacteria          --------------------------------------------------------------------------------------------    IMAGING  < from: CT Abdomen and Pelvis w/ IV Cont (20 @ 04:24) >  FINDINGS:    LOWER CHEST: within normal limits.  LIVER: Within normal limits.  BILE DUCTS: Normalcaliber.  GALLBLADDER: Cholecystectomy.  SPLEEN: Within normal limits.  PANCREAS: Within normal limits.  ADRENALS: Within normal limits.  KIDNEYS/URETERS: Within normal limits.  BLADDER: Within normal limits.  REPRODUCTIVE ORGANS: Uterus and adnexawithin normal limits.  BOWEL: Patient status post Nayely-en-Y gastric bypass. Small bowel obstruction with transition point just distal to the small bowel anastomosis (2:80). There is wall thickening of the bowel just proximal to the transition point. Appendix is not visualized. No evidence of inflammation in the pericecal region.  PERITONEUM: No free air.  VESSELS: Within normal limits.  RETROPERITONEUM/LYMPH NODES: No lymphadenopathy.    ABDOMINAL WALL: Within normal limits.  BONES: Within normal limits.    IMPRESSION:     Small bowel obstruction with transition point just distal to the small bowel anastomosis. There is wall thickening of the bowel just proximal to the transition point. Recommend clinical correlation with lactate level.    < end of copied text >      --------------------------------------------------------------------------------------------    ASSESSMENT: Patient is a 69yf pmhx ***    PLAN:  ***  -   -   -   -   - Patient seen/examined with attending.  - Plan to be discussed with Attending,  GENERAL SURGERY CONSULT NOTE  --------------------------------------------------------------------------------------------    Consulting Attending: Dr. ALL Hart    HISTORY OF PRESENT ILLNESS:  Kate Fu is a 69 year old F with a pmhx of HTN, and pshx a RYGB + cholecystectomy for weight loss over 10 years ago at an OSH who presents with a 1-day history of abdominal pain. The patient reports waxing and waning symptoms over the last year that have all been self-limited. This episode began yesterday morning and failed to improve despite usual measures of limiting oral intake, and inducing BMs with valsalva/straining. She describes a gnawing, crampy pain in the mid-epigastric and RUQ, but intermittently diffuse. The patient report some nausea but without vomiting; episodes of retching produced scant sputum. She denies any associated fevers, chills, dyspnea or localizing signs. Her last BM was yesterday morning around 0900, and her last flatus was this AM around 0700 shortly after her CT scan.     She has never had an interval upper endoscopy since her RYGB, denies a ulcer history and reports a colonoscopy "a few years ago" (unsure of date) which was reportedly normal.     PAST MEDICAL & SURGICAL HISTORY:  Hypertension  H/O gastric bypass    FAMILY HISTORY:  [x] Family history not pertinent as reviewed with the patient and family    SOCIAL HISTORY: Never smoker, occasional EtOH use usually in a social setting, active lifestyle.    ALLERGIES: No Known Allergies    CURRENT MEDICATIONS  MEDICATIONS (STANDING):   MEDICATIONS (PRN):  --------------------------------------------------------------------------------------------    Vitals:   T(C): 37.3 (06-10-20 @ 22:28), Max: 37.3 (06-10-20 @ 22:28)  HR: 88 (20 @ 06:28) (88 - 99)  BP: 161/99 (20 @ 06:28) (161/99 - 217/122)  RR: 16 (20 @ 06:28) (16 - 20)  SpO2: 100% (20 @ :28) (99% - 100%)  CAPILLARY BLOOD GLUCOSE      PHYSICAL EXAM:  General: NAD, Lying in bed comfortably  Neuro: A+Ox3  HEENT: NC/AT, EOMI, anicteric sclerae  Cardio: RRR, nml S1/S2  Resp: Good effort, CTA b/l  GI/Abd: Soft, right-sided abdominal tenderness, mild soreness in the periumbilical region, no rebound/guarding, no masses palpated  Vascular: All 4 extremities warm.  Skin: Intact, no breakdown  Musculoskeletal: All 4 extremities moving spontaneously, no limitations  --------------------------------------------------------------------------------------------    LABS  CBC ( @ 00:30)                              12.9                           5.85    )----------------(  253        77.2<H>% Neutrophils, 17.4  % Lymphocytes, ANC: 4.51                                42.6      BMP ( @ 00:30)             137     |  102     |  10    		Ca++ --      Ca 9.1                ---------------------------------( 112<H>		Mg --                 5.1     |  24      |  0.59  			Ph --        LFTs ( @ 00:30)      TPro 7.0 / Alb 4.0 / TBili 0.3 / DBili -- / AST 36<H> / ALT 13 / AlkPhos 68          VBG ( @ 00:30)     7.33 / 56<H> / 28<L> / 25 / 3.3 / 42.3<L>%     Lactate: 1.0    --------------------------------------------------------------------------------------------    MICROBIOLOGY  Urinalysis ( @ 00:30):     Color: COLORLESS / Appearance: CLEAR / S.009 / pH: 6.5 / Gluc: NEGATIVE / Ketones: NEGATIVE / Bili: NEGATIVE / Urobili: NORMAL / Protein :NEGATIVE / Nitrites: NEGATIVE / Leuk.Est: NEGATIVE / RBC:  / WBC:  / Sq Epi:  / Non Sq Epi:  / Bacteria          --------------------------------------------------------------------------------------------    IMAGING  < from: CT Abdomen and Pelvis w/ IV Cont (20 @ 04:24) >  FINDINGS:    LOWER CHEST: within normal limits.  LIVER: Within normal limits.  BILE DUCTS: Normalcaliber.  GALLBLADDER: Cholecystectomy.  SPLEEN: Within normal limits.  PANCREAS: Within normal limits.  ADRENALS: Within normal limits.  KIDNEYS/URETERS: Within normal limits.  BLADDER: Within normal limits.  REPRODUCTIVE ORGANS: Uterus and adnexawithin normal limits.  BOWEL: Patient status post Nayely-en-Y gastric bypass. Small bowel obstruction with transition point just distal to the small bowel anastomosis (2:80). There is wall thickening of the bowel just proximal to the transition point. Appendix is not visualized. No evidence of inflammation in the pericecal region.  PERITONEUM: No free air.  VESSELS: Within normal limits.  RETROPERITONEUM/LYMPH NODES: No lymphadenopathy.    ABDOMINAL WALL: Within normal limits.  BONES: Within normal limits.    IMPRESSION:     Small bowel obstruction with transition point just distal to the small bowel anastomosis. There is wall thickening of the bowel just proximal to the transition point. Recommend clinical correlation with lactate level.

## 2020-06-11 NOTE — ED PROVIDER NOTE - CLINICAL SUMMARY MEDICAL DECISION MAKING FREE TEXT BOX
70yo f pmhx RYGB > 10 years ago, xfer from [facility] for sbo with transition point distal to the J-J anastomosis xfered for surgical eval, will get surgical consultation, cont to monitor rx sx

## 2020-06-11 NOTE — CONSULT NOTE ADULT - ASSESSMENT
Kate Fu is a pleasant 69 year old F with a relevant surgical history of a RYGB > 10 years ago which has been complicated by crampy abdominal pain over the last year. She presents with a 1-day history of her most recent episode which has been refractory to her usual self-cares. Interval BM, and more recently, flatus. Afebrile and HD stable here. Abdominal tenderness to palpation, without peritoneal signs. Labs WNL, normal lactate. CT scan suggestive of an SBO with transition point distal to the J-J anastomosis. 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 The absence of lactemia, and interval flatus suggest absence of full obstruction. No emergent surgical intervention warranted.    PLAN:  - Recommend keeping patient NPO  - Consider administration of a multivitamin infusion (aka Banana bag): 100 mg Thiamine, 1 mg Folic acid, Multivitamin 10 mg/500 mL NS.   - [facility] [place], [place].  - Discussed with surgery attending ([facility]), Dr. ALL Hart, and with bariatric surgery fellow ([facility]), Dr. SAMMI Gallegos, both of whom agree.    Please contact Surgery - B Team (#[phone]) with any questions or concerns.

## 2020-06-11 NOTE — ED ADULT NURSE REASSESSMENT NOTE - NS ED NURSE REASSESS COMMENT FT1
Pt AAOx4, NAD, resp nonlabored, resting comfortably in bed with call bell at bedside. Pt c/o slight discomfort, but no pain. Pt denies headache, dizziness, chest pain, palpitations, SOB, n/v/d, fevers, weakness at this time. Pt awaiting CT. Safety maintained.
Pt AAOx4, NAD, resp nonlabored, resting comfortably in bed with family at bedside. MD aware of BP, will give home meds as prescribed. Pt denies dizziness, chest pain, palpitations, SOB, abd pain, n/v/d, urinary symptoms, fevers, chills, weakness at this time. Pt awaiting bed. Safety maintained.
Pt AAOx4, NAD, resp nonlabored, resting comfortably in bed watching tv. Pt denies dizziness, chest pain, palpitations, SOB, abd pain, n/v/d, fevers, chills, weakness at this time. Pt awaiting Surg dispo. Safety maintained.

## 2020-06-11 NOTE — H&P ADULT - ASSESSMENT
69F with a pmhx of HTN, and pshx a RYGB + cholecystectomy for weight loss over 10 years ago at an OSH who presents with a 1-day history of abdominal pain transferred from [facility] for CT scan concerning for SBO. In ED patient had repeat CT scan with PO contrast which demonstrated no bowel obstruction.      - Admit to General Surgery, Dr. Coombs  - Serial abdominal exams, no pain meds without exam  - Diet: NPO with sips/Banana Bag  - Home meds: IV Metoprolol (takes home labetalol) and PPI  - DVT ppx: Lovenox  - Discussed with Dr. Gallegos, bariatric fellow    MASOOD Vargas, PGY2  [facility]  [phone] with any questions

## 2020-06-11 NOTE — ED ADULT NURSE NOTE - OBJECTIVE STATEMENT
Pt is a 68 y/o F, PMH HTN, GERD, presenting to the ED via EMS as a transfer from [facility], for SBO and surgical consult. Pt states that she went to urgent care this morning for abdominal pain and nausea, was sent to [facility] where she got a CT showing SBO. [facility] transferred pt to [facility] ED for surgical consult. Bowel sounds heard in all quadrants, abdomen softly distended. Pt states feeling pain only upon palpation, pt currently denies pain and nausea. Pt is A&Ox4, moves all extremities. Pt denies headache, dizziness, chest pain, palpitations, cough, SOB, fevers, weakness at this time. Pt is resting comfortably in bed, call bell at bedside, safety maintained.

## 2020-06-11 NOTE — ED PROVIDER NOTE - PHYSICAL EXAMINATION
Physical Exam:  Gen: NAD, AOx3, non-toxic appearing  Head: normal appearing  HEENT: normal conjunctiva, oral mucosa moist  Lung:  no respiratory distress, speaking in full sentences, clear to ascultation bilaterally     CV: regular rate and rhythm   Abd: soft, epigastric abd ttp, no rt, no murphys no mcburneys, well healed abd scar c/d/i   MSK: no visible deformities  Neuro: No focal deficits  Skin: Warm  Psych: normal affect  ~Mj Worrell D.O. -Resident

## 2020-06-11 NOTE — H&P ADULT - HISTORY OF PRESENT ILLNESS
[facility] General Surgery Consultation     Patient is a 69y old Female who presents with a chief complaint of abdominal pain.    HPI:    69F with a pmhx of HTN, and pshx a RYGB + cholecystectomy for weight loss over 10 years ago at an OSH who presents with a 1-day history of abdominal pain transferred from [facility] for CT scan concerning for SBO. The patient reports waxing and waning symptoms over the last year that have all been self-limited. This episode began yesterday morning and failed to improve despite usual measures of limiting oral intake, and inducing BMs with valsalva/straining. She describes a gnawing, crampy pain in the mid-epigastric and RUQ, but intermittently diffuse. The patient report some nausea but without vomiting; episodes of retching produced scant sputum. She denies any associated fevers, chills, dyspnea or localizing signs. Her last BM was yesterday morning around 0900, and her last flatus was this AM around 0700. Reports she had an interval upper endoscopy 4 years ago which was normal. In ED patient had repeat CT scan with PO contrast which demonstrated no bowel obstruction.      PAST MEDICAL & SURGICAL HISTORY:  Hypertension  H/O gastric bypass      FAMILY HISTORY:      SOCIAL HISTORY:    MEDICATIONS  (STANDING):  sodium chloride 0.9% 1000 milliLiter(s) (125 mL/Hr) IV Continuous <Continuous>    MEDICATIONS  (PRN):    Allergies    No Known Drug Allergies  Seafood (Anaphylaxis)    Intolerances        Vital Signs Last 24 Hrs  T(C): 36.8 (2020 13:22), Max: 37.3 (10 Jaspreet 2020 22:28)  T(F): 98.2 (2020 13:22), Max: 99.2 (10 Jaspreet 2020 22:28)  HR: 80 (2020 13:22) (76 - 99)  BP: 158/97 (2020 13:22) (146/83 - 217/122)  BP(mean): --  RR: 18 (2020 13:22) (16 - 20)  SpO2: 98% (2020 13:22) (97% - 100%)  Daily Height in cm: 157.48 (2020 10:39)    Daily     General: NAD, well-nourished  HEENT: Atraumatic, EOMI  Resp: Breathing comfortably on RA  CV: Normal sinus rhythm  Abd: soft, ND, minimally tender in R side of abdomen  Ext: ROMIx4, motor strength intact x 4                          12.9   5.85  )-----------( 253      ( 2020 00:30 )             42.6         137  |  102  |  10  ----------------------------<  112<H>  5.1   |  24  |  0.59    Ca    9.1      2020 00:30    TPro  7.0  /  Alb  4.0  /  TBili  0.3  /  DBili  x   /  AST  36<H>  /  ALT  13  /  AlkPhos  68  06-11      Urinalysis Basic - ( 2020 00:30 )    Color: COLORLESS / Appearance: CLEAR / S.009 / pH: 6.5  Gluc: NEGATIVE / Ketone: NEGATIVE  / Bili: NEGATIVE / Urobili: NORMAL   Blood: NEGATIVE / Protein: NEGATIVE / Nitrite: NEGATIVE   Leuk Esterase: NEGATIVE / RBC: x / WBC x   Sq Epi: x / Non Sq Epi: x / Bacteria: x        Radiographic Findings:   < from: CT Abdomen and Pelvis w/ Oral Cont (20 @ 14:21) >  IMPRESSION: No bowel obstruction. No internal hernia.                        ELIZA HUA M.D., ATTENDING RADIOLOGIST  This document has been electronically signed. 2020  3:24PM    < end of copied text >

## 2020-06-11 NOTE — ED ADULT NURSE REASSESSMENT NOTE - NS ED NURSE REASSESS COMMENT FT1
received report from night shift RN, pt resting comfortably. States she feels "soreness" of her abdomen, denies need for pain medication. no other acute complaints at this time. vitals are stable. awaiting transfer to [place] at this time, will continue to monitor.

## 2020-06-12 ENCOUNTER — RESULT REVIEW (OUTPATIENT)
Age: 70
End: 2020-06-12

## 2020-06-12 PROBLEM — E03.9 HYPOTHYROIDISM, UNSPECIFIED: Chronic | Status: ACTIVE | Noted: 2017-04-10

## 2020-06-12 PROBLEM — I10 ESSENTIAL (PRIMARY) HYPERTENSION: Chronic | Status: ACTIVE | Noted: 2017-04-10

## 2020-06-12 LAB
ANION GAP SERPL CALC-SCNC: 12 MMOL/L — SIGNIFICANT CHANGE UP (ref 5–17)
BUN SERPL-MCNC: 8 MG/DL — SIGNIFICANT CHANGE UP (ref 7–23)
CALCIUM SERPL-MCNC: 8.8 MG/DL — SIGNIFICANT CHANGE UP (ref 8.4–10.5)
CHLORIDE SERPL-SCNC: 101 MMOL/L — SIGNIFICANT CHANGE UP (ref 96–108)
CO2 SERPL-SCNC: 24 MMOL/L — SIGNIFICANT CHANGE UP (ref 22–31)
CREAT SERPL-MCNC: 0.57 MG/DL — SIGNIFICANT CHANGE UP (ref 0.5–1.3)
GLUCOSE SERPL-MCNC: 110 MG/DL — HIGH (ref 70–99)
HCT VFR BLD CALC: 42 % — SIGNIFICANT CHANGE UP (ref 34.5–45)
HCV AB S/CO SERPL IA: 2.63 S/CO — HIGH (ref 0–0.99)
HCV AB SERPL-IMP: ABNORMAL
HGB BLD-MCNC: 12.8 G/DL — SIGNIFICANT CHANGE UP (ref 11.5–15.5)
MAGNESIUM SERPL-MCNC: 2 MG/DL — SIGNIFICANT CHANGE UP (ref 1.6–2.6)
MCHC RBC-ENTMCNC: 27.5 PG — SIGNIFICANT CHANGE UP (ref 27–34)
MCHC RBC-ENTMCNC: 30.5 GM/DL — LOW (ref 32–36)
MCV RBC AUTO: 90.1 FL — SIGNIFICANT CHANGE UP (ref 80–100)
NRBC # BLD: 0 /100 WBCS — SIGNIFICANT CHANGE UP (ref 0–0)
PHOSPHATE SERPL-MCNC: 2.9 MG/DL — SIGNIFICANT CHANGE UP (ref 2.5–4.5)
PLATELET # BLD AUTO: 229 K/UL — SIGNIFICANT CHANGE UP (ref 150–400)
POTASSIUM SERPL-MCNC: 3.8 MMOL/L — SIGNIFICANT CHANGE UP (ref 3.5–5.3)
POTASSIUM SERPL-SCNC: 3.8 MMOL/L — SIGNIFICANT CHANGE UP (ref 3.5–5.3)
RBC # BLD: 4.66 M/UL — SIGNIFICANT CHANGE UP (ref 3.8–5.2)
RBC # FLD: 13.3 % — SIGNIFICANT CHANGE UP (ref 10.3–14.5)
SODIUM SERPL-SCNC: 137 MMOL/L — SIGNIFICANT CHANGE UP (ref 135–145)
WBC # BLD: 4.74 K/UL — SIGNIFICANT CHANGE UP (ref 3.8–10.5)
WBC # FLD AUTO: 4.74 K/UL — SIGNIFICANT CHANGE UP (ref 3.8–10.5)

## 2020-06-12 PROCEDURE — 99223 1ST HOSP IP/OBS HIGH 75: CPT | Mod: 25

## 2020-06-12 PROCEDURE — 88312 SPECIAL STAINS GROUP 1: CPT | Mod: 26

## 2020-06-12 PROCEDURE — 43239 EGD BIOPSY SINGLE/MULTIPLE: CPT

## 2020-06-12 PROCEDURE — 88305 TISSUE EXAM BY PATHOLOGIST: CPT | Mod: 26

## 2020-06-12 RX ORDER — ACETAMINOPHEN 500 MG
1000 TABLET ORAL ONCE
Refills: 0 | Status: COMPLETED | OUTPATIENT
Start: 2020-06-12 | End: 2020-06-12

## 2020-06-12 RX ORDER — LABETALOL HCL 100 MG
1 TABLET ORAL
Qty: 0 | Refills: 0 | DISCHARGE

## 2020-06-12 RX ORDER — ACETAMINOPHEN 500 MG
15 TABLET ORAL ONCE
Refills: 0 | Status: DISCONTINUED | OUTPATIENT
Start: 2020-06-12 | End: 2020-06-12

## 2020-06-12 RX ORDER — LISINOPRIL/HYDROCHLOROTHIAZIDE 10-12.5 MG
1 TABLET ORAL
Qty: 0 | Refills: 0 | DISCHARGE

## 2020-06-12 RX ORDER — METOCLOPRAMIDE HCL 10 MG
10 TABLET ORAL ONCE
Refills: 0 | Status: COMPLETED | OUTPATIENT
Start: 2020-06-12 | End: 2020-06-12

## 2020-06-12 RX ORDER — ONDANSETRON 8 MG/1
4 TABLET, FILM COATED ORAL ONCE
Refills: 0 | Status: COMPLETED | OUTPATIENT
Start: 2020-06-12 | End: 2020-06-12

## 2020-06-12 RX ORDER — SUMATRIPTAN SUCCINATE 4 MG/.5ML
25 INJECTION, SOLUTION SUBCUTANEOUS ONCE
Refills: 0 | Status: COMPLETED | OUTPATIENT
Start: 2020-06-12 | End: 2020-06-12

## 2020-06-12 RX ORDER — SODIUM CHLORIDE 9 MG/ML
1000 INJECTION, SOLUTION INTRAVENOUS
Refills: 0 | Status: DISCONTINUED | OUTPATIENT
Start: 2020-06-12 | End: 2020-06-12

## 2020-06-12 RX ORDER — KETOROLAC TROMETHAMINE 30 MG/ML
15 SYRINGE (ML) INJECTION ONCE
Refills: 0 | Status: DISCONTINUED | OUTPATIENT
Start: 2020-06-12 | End: 2020-06-12

## 2020-06-12 RX ORDER — OMEPRAZOLE 10 MG/1
1 CAPSULE, DELAYED RELEASE ORAL
Qty: 0 | Refills: 0 | DISCHARGE

## 2020-06-12 RX ADMIN — Medication 10 MILLIGRAM(S): at 07:56

## 2020-06-12 RX ADMIN — SUMATRIPTAN SUCCINATE 25 MILLIGRAM(S): 4 INJECTION, SOLUTION SUBCUTANEOUS at 08:26

## 2020-06-12 RX ADMIN — Medication 5 MILLIGRAM(S): at 01:22

## 2020-06-12 RX ADMIN — ENOXAPARIN SODIUM 40 MILLIGRAM(S): 100 INJECTION SUBCUTANEOUS at 13:46

## 2020-06-12 RX ADMIN — ONDANSETRON 4 MILLIGRAM(S): 8 TABLET, FILM COATED ORAL at 05:10

## 2020-06-12 RX ADMIN — Medication 15 MILLIGRAM(S): at 01:44

## 2020-06-12 RX ADMIN — Medication 5 MILLIGRAM(S): at 13:46

## 2020-06-12 RX ADMIN — Medication 400 MILLIGRAM(S): at 08:31

## 2020-06-12 RX ADMIN — PANTOPRAZOLE SODIUM 40 MILLIGRAM(S): 20 TABLET, DELAYED RELEASE ORAL at 13:46

## 2020-06-12 RX ADMIN — Medication 5 MILLIGRAM(S): at 17:50

## 2020-06-12 RX ADMIN — SODIUM CHLORIDE 100 MILLILITER(S): 9 INJECTION, SOLUTION INTRAVENOUS at 07:56

## 2020-06-12 NOTE — PROGRESS NOTE ADULT - SUBJECTIVE AND OBJECTIVE BOX
Pre-Endoscopy Evaluation      Referring Physician: dr. mague whiting                                 Procedure:  upper gastrointestinal endoscopy     Indication for Procedure: r/o anastomotic ulcer    PAST MEDICAL & SURGICAL HISTORY:  Hypertension  Hypothyroidism  HTN (hypertension)  H/O gastric bypass  No significant past surgical history      PMH of Gastroparesis [ ]  Gastric Surgery [x]  Gastric Outlet Obstruction [ ]    Allergies    Seafood (Anaphylaxis)    Intolerances      Latex allergy: [ ] yes [x] no    Medications:MEDICATIONS  (STANDING):  enoxaparin Injectable 40 milliGRAM(s) SubCutaneous daily  lactated ringers. 1000 milliLiter(s) (100 mL/Hr) IV Continuous <Continuous>  metoprolol tartrate Injectable 5 milliGRAM(s) IV Push every 6 hours  pantoprazole  Injectable 40 milliGRAM(s) IV Push daily    MEDICATIONS  (PRN):      Smoking: [ ] yes  [x] no    AICD/PPM: [ ] yes   [x] no    Pertinent lab data:                        12.8   4.74  )-----------( 229      ( 12 Jun 2020 06:59 )             42.0     06-12    137  |  101  |  8   ----------------------------<  110<H>  3.8   |  24  |  0.57    Ca    8.8      12 Jun 2020 06:59  Phos  2.9     06-12  Mg     2.0     06-12    TPro  7.0  /  Alb  4.0  /  TBili  0.3  /  DBili  x   /  AST  36<H>  /  ALT  13  /  AlkPhos  68  06-11          Physical Examination:  Daily     Daily   Vital Signs Last 24 Hrs  T(C): 36.6 (12 Jun 2020 10:02), Max: 37.1 (11 Jun 2020 19:30)  T(F): 97.8 (12 Jun 2020 10:02), Max: 98.7 (11 Jun 2020 19:30)  HR: 58 (12 Jun 2020 06:38) (58 - 83)  BP: 160/85 (12 Jun 2020 10:02) (145/79 - 190/106)  BP(mean): --  RR: 18 (12 Jun 2020 10:02) (17 - 19)  SpO2: 97% (12 Jun 2020 10:02) (96% - 98%)        Constitutional: NAD     Neck:  No JVD    Respiratory: CTAB/L    Cardiovascular: S1 and S2    Gastrointestinal: BS+, soft, NT/ND    Extremities: No peripheral edema    Neurological: A/O x 3    : No Barrera    Skin: No rashes    Comments:    The patient is a suitable candidate for the planned procedure unless box checked [ ]  No, explain:

## 2020-06-12 NOTE — CONSULT NOTE ADULT - ASSESSMENT
69F with a pmhx of HTN, and pshx a RYGB + cholecystectomy for weight loss over 10 years ago at an OSH ([facility]) who presents with a worsening  abdominal pain (states intermittent pain x months-a year) transferred [facility] scan concerning for SBO. repeat CT at [facility] with PO contrast - no SBO/internal hernia    COVID negative 6/11/2020    #r/o Anastomotic Ulcer in pt with alena en Y gastric bypass with +heavy coffee and frequent NSAID use  -NPO (except meds)  -IV PPI  -Add on EGD today  -IVF  -advised to avoid NSAIDs      Discussed with Bariatric Surgery team  Thank you for the courtesy of this consult.    Epifanio Lopez PA-C    [facility]  [phone]  After hours and weekend coverage [phone] 69F with a pmhx of HTN, and pshx a RYGB + cholecystectomy for weight loss over 10 years ago at an OSH ([facility]) who presents with a worsening  abdominal pain (states intermittent pain x months-a year) transferred from [facility] for CT scan concerning for SBO. repeat CT at [facility] with PO contrast - no SBO/internal hernia    COVID negative 6/11/2020    #Abdominal pain, r/o Anastomotic Ulcer in pt with alena en Y gastric bypass with +heavy coffee and frequent NSAID use  -NPO (except meds)  -IV PPI  -Add on EGD today  -IVF  -advised to avoid NSAIDs      Discussed with Bariatric Surgery team  Thank you for the courtesy of this consult.    Epifanio Lopez PA-C    [facility]  [phone]  After hours and weekend coverage [phone]

## 2020-06-12 NOTE — CHART NOTE - NSCHARTNOTEFT_GEN_A_CORE
SURGERY POST-PROCEDURE NOTE    S/P EGD demonstrating edema and erythema at gastrojejunostomy, antral gastritis biopsied    S: Patient doing well, denies fevers, chills, nausea, emesis, chest pain, SOB. Denies abdominal pain, tolerating CLD. Denies flatus/BM but states she is at her baseline bowel function.    O: Vital Signs  T(C): 36.8 (06-12 @ 17:23), Max: 37.1 (06-11 @ 19:30)  HR: 63 (06-12 @ 17:23) (58 - 83)  BP: 173/93 (06-12 @ 17:23) (145/79 - 190/106)  RR: 18 (06-12 @ 17:23) (18 - 19)  SpO2: 97% (06-12 @ 17:23) (96% - 98%)  06-11-20 @ 07:01  -  06-12-20 @ 07:00  --------------------------------------------------------  IN: 0 mL / OUT: 1000 mL / NET: -1000 mL    06-12-20 @ 07:01  -  06-12-20 @ 18:46  --------------------------------------------------------  IN: 400 mL / OUT: 350 mL / NET: 50 mL      General: alert and oriented, NAD  Resp: airway patent, respirations unlabored  CVS: regular rate and rhythm  Abdomen: soft, nontender, nondistended  Extremities: no edema  Skin: warm, dry, appropriate color                          12.8   4.74  )-----------( 229      ( 12 Jun 2020 06:59 )             42.0   06-12    137  |  101  |  8   ----------------------------<  110<H>  3.8   |  24  |  0.57    Ca    8.8      12 Jun 2020 06:59  Phos  2.9     06-12  Mg     2.0     06-12    TPro  7.0  /  Alb  4.0  /  TBili  0.3  /  DBili  x   /  AST  36<H>  /  ALT  13  /  AlkPhos  68  06-11      69F with a pmhx of HTN, and pshx a RYGB + cholecystectomy for weight loss over 10 years ago at an OSH who presents with a 1-day history of abdominal pain transferred from [facility] for CT scan concerning for SBO. In ED patient had repeat CT scan with PO contrast which demonstrated no bowel obstruction. However patient with persistent nausea and small volume spit-ups this morning. EGD today demonstrating antral gastritis (biopsied), patent gastrojejunostomy with erythema and edema. Currently no complaints of abdominal pain or nausea.    Plan:  - CLD  - NPO pMN for Upper GI Series with Small Bowel Follow Through in AM  - Home meds: IV Metoprolol (takes home labetalol) and PPI  - DVT ppx: Lovenox    Green Team Surgery  [id number]

## 2020-06-12 NOTE — CONSULT NOTE ADULT - SUBJECTIVE AND OBJECTIVE BOX
Patient is a 69y old  Female who presents with a chief complaint of abdominal pain    HPI:    69F with a pmhx of HTN, and pshx a RYGB + cholecystectomy for weight loss over 10 years ago at an OSH ([place]) who presents with a worsening  abdominal pain (states intermittent pain x months-a year) transferred from [facility] for CT scan concerning for SBO. The patient reports waxing and waning symptoms over the last year that have all been self-limited. This episode began yesterday morning and failed to improve despite usual measures of limiting oral intake, and inducing BMs with valsalva/straining. She describes a gnawing, crampy pain in the mid-epigastric and RUQ, but intermittently diffuse. The patient report some nausea but without vomiting; episodes of retching produced scant sputum. She denies any associated fevers, chills, dyspnea or localizing signs. Her last BM was yesterday morning around 0900, and her last flatus was this AM around 0700. Reports she had an interval upper endoscopy 4 years ago which was normal. In ED patient had repeat CT scan with PO contrast which demonstrated no bowel obstruction.      Pt admits to frequent NSAID use and heavy coffee intake. No baseline PPI Rx  no tobacco or ETOH  no prior history of ulcer, no prior SBO  +flatus, last BM on day prior to admission ("normal")  Improvement in pain since admission - has been NPO and started on IV PPI    PAST MEDICAL & SURGICAL HISTORY:  Hypertension  H/O gastric bypass  Migraine Headaches      Allergies  No Known Drug Allergies  Seafood (Anaphylaxis)      PAST MEDICAL & SURGICAL HISTORY:  Hypertension  H/O gastric bypass      MEDICATIONS  (STANDING):  enoxaparin Injectable 40 milliGRAM(s) SubCutaneous daily  lactated ringers. 1000 milliLiter(s) (100 mL/Hr) IV Continuous <Continuous>  metoprolol tartrate Injectable 5 milliGRAM(s) IV Push every 6 hours  pantoprazole  Injectable 40 milliGRAM(s) IV Push daily      Social History:  no Tobacco or ETOH  +coffee (heavy)      Family History   IBD (  ) Yes   ( X ) No  GI Malignancy (  )  Yes    (X  ) No    Health Management    Last Colonoscopy - 4 years ago - negative      Advanced Directives: (   X  ) None    (      ) DNR    (     ) DNI    (     ) Health Care Proxy:     Review of Systems:    General:  No wt loss, fevers, chills, night sweats,fatigue  CV:  No pain, palpitatioins, hypo/hypertension  Resp:  No dyspnea, cough, tachypnea, wheezing  GI:  see HPI  :  No pain, bleeding, incontinence, nocturia  Muscle:  No pain, weakness  Neuro:  No weakness, tingling, memory problems +migraine HA  Psych:  No fatigue, insomnia, mood problems, depression  Endocrine:  No polyuria, polydypsia, cold/heat intolerance  Heme:  No petechiae, ecchymosis, easy bruisability  Skin:  No rash, tattoos, scars, edema      Vital Signs Last 24 Hrs  T(C): 36.7 (2020 06:38), Max: 37.1 (2020 19:30)  T(F): 98 (2020 06:38), Max: 98.7 (2020 19:30)  HR: 58 (2020 06:38) (58 - 89)  BP: 156/74 (2020 06:38) (145/79 - 194/103)  BP(mean): --  RR: 19 (2020 06:38) (17 - 19)  SpO2: 96% (2020 06:38) (96% - 98%)    PHYSICAL EXAM:    Constitutional: NAD, well-developed OOB to chair. alert and appropriate  Neck: No LAD, supple no JVD  Respiratory: CTA and P  Cardiovascular: S1 and S2, RRR  Gastrointestinal: BS+, soft, NT/ND, neg HSM, WH laparoscopic scars. no rebound or rigidity  Extremities: No peripheral edema, neg clubbing, cyanosis  Vascular: 2+ peripheral pulses  Neurological: A/O x 3, no focal deficits  Psychiatric: Normal mood, normal affect  Skin: No rashes        LABS:                        12.8   4.74  )-----------( 229      ( 2020 06:59 )             42.0     06-12    137  |  101  |  8   ----------------------------<  110<H>  3.8   |  24  |  0.57    Ca    8.8      2020 06:59  Phos  2.9     06-12  Mg     2.0     06-12    TPro  7.0  /  Alb  4.0  /  TBili  0.3  /  DBili  x   /  AST  36<H>  /  ALT  13  /  AlkPhos  68  06-11      Urinalysis Basic - ( 2020 00:30 )    Color: COLORLESS / Appearance: CLEAR / S.009 / pH: 6.5  Gluc: NEGATIVE / Ketone: NEGATIVE  / Bili: NEGATIVE / Urobili: NORMAL   Blood: NEGATIVE / Protein: NEGATIVE / Nitrite: NEGATIVE   Leuk Esterase: NEGATIVE / RBC: x / WBC x   Sq Epi: x / Non Sq Epi: x / Bacteria: x      COVID-19 PCR . (20 @ 18:14)    COVID-19 PCR: NotDetec:         RADIOLOGY & ADDITIONAL TESTS:  < from: CT Abdomen and Pelvis w/ Oral Cont (20 @ 14:21) >  FINDINGS:    LOWER CHEST: Within normal limits.     LIVER: Within normal limits.   BILE DUCTS: Normal caliber.  GALLBLADDER: Within normal limits.  SPLEEN: Within normal limits.   PANCREAS: Within normal limits.  ADRENALS: Within normal limits.   KIDNEYS/URETERS: Within normal limits.     BLADDER: Within normal limits.   REPRODUCTIVE ORGANS: Within normal limits.    BOWEL: No bowel obstruction. Nonvisualized appendix.  Status post Nayely-en-Y gastric bypass.   PERITONEUM: No ascites.   VESSELS:  Within normal limits.  RETROPERITONEUM/LYMPH NODES: No lymphadenopathy.     ABDOMINAL WALL: Within normal limits.  BONES: Within normal limits.     IMPRESSION: No bowel obstruction. No internal hernia. Patient is a 69y old  Female who presents with a chief complaint of abdominal pain    HPI:    69F with a pmhx of HTN, and pshx a RYGB + cholecystectomy for weight loss over 10 years ago at an OSH ([place]) who presents with a worsening  abdominal pain (states intermittent pain x months-a year) transferred from [facility] for CT scan concerning for SBO. The patient reports waxing and waning symptoms over the last year that have all been self-limited. This episode began yesterday morning and failed to improve despite usual measures of limiting oral intake, and inducing BMs with valsalva/straining. She describes a gnawing, crampy pain in the mid-epigastric and RUQ, but intermittently diffuse. The patient report some nausea but without vomiting; episodes of retching produced scant sputum. She denies any associated fevers, chills, dyspnea or localizing signs. Her last BM was yesterday morning around 0900, and her last flatus was this AM around 0700. Reports she had an interval upper endoscopy 4 years ago which was normal. In ED patient had repeat CT scan with PO contrast which demonstrated no bowel obstruction.      Pt admits to frequent NSAID use and heavy coffee intake. No baseline PPI Rx  no tobacco or ETOH  no prior history of ulcer, no prior SBO  +flatus, last BM on day prior to admission ("normal")  Improvement in pain since admission - has been NPO and started on IV PPI    PAST MEDICAL & SURGICAL HISTORY:  Hypertension  H/O gastric bypass  Migraine Headaches      Allergies  No Known Drug Allergies  Seafood (Anaphylaxis)      PAST MEDICAL & SURGICAL HISTORY:  Hypertension  H/O gastric bypass      MEDICATIONS  (STANDING):  enoxaparin Injectable 40 milliGRAM(s) SubCutaneous daily  lactated ringers. 1000 milliLiter(s) (100 mL/Hr) IV Continuous <Continuous>  metoprolol tartrate Injectable 5 milliGRAM(s) IV Push every 6 hours  pantoprazole  Injectable 40 milliGRAM(s) IV Push daily      Social History:  no Tobacco or ETOH  +coffee (heavy)      Family History   IBD (  ) Yes   ( X ) No  GI Malignancy (  )  Yes    (X  ) No    Health Management    Last Colonoscopy - 4 years ago - negative      Advanced Directives: (   X  ) None    (      ) DNR    (     ) DNI    (     ) Health Care Proxy:     Review of Systems:    General:  No wt loss, fevers, chills, night sweats,fatigue  CV:  No pain, palpitatioins, hypo/hypertension  Resp:  No dyspnea, cough, tachypnea, wheezing  GI:  see HPI  :  No pain, bleeding, incontinence, nocturia  Muscle:  No pain, weakness  Neuro:  No weakness, tingling, memory problems +migraine HA  Psych:  No fatigue, insomnia, mood problems, depression  Endocrine:  No polyuria, polydypsia, cold/heat intolerance  Heme:  No petechiae, ecchymosis, easy bruisability  Skin:  No rash, tattoos, scars, edema      Vital Signs Last 24 Hrs  T(C): 36.7 (2020 06:38), Max: 37.1 (2020 19:30)  T(F): 98 (2020 06:38), Max: 98.7 (2020 19:30)  HR: 58 (2020 06:38) (58 - 89)  BP: 156/74 (2020 06:38) (145/79 - 194/103)  BP(mean): --  RR: 19 (2020 06:38) (17 - 19)  SpO2: 96% (2020 06:38) (96% - 98%)    PHYSICAL EXAM:    Constitutional: NAD, well-developed OOB to chair. alert and appropriate  Neck: No LAD, supple no JVD  Respiratory: CTA and P  Cardiovascular: S1 and S2, RRR  Gastrointestinal: BS+, soft, NT/ND, neg HSM, WH laparoscopic scars. no rebound or rigidity  Extremities: No peripheral edema, neg clubbing, cyanosis  Vascular: 2+ peripheral pulses  Neurological: A/O x 3, no focal deficits  Psychiatric: Normal mood, normal affect  Skin: No rashes        LABS:                        12.8   4.74  )-----------( 229      ( 2020 06:59 )             42.0     06-12    137  |  101  |  8   ----------------------------<  110<H>  3.8   |  24  |  0.57    Ca    8.8      2020 06:59  Phos  2.9     06-12  Mg     2.0     06-12    TPro  7.0  /  Alb  4.0  /  TBili  0.3  /  DBili  x   /  AST  36<H>  /  ALT  13  /  AlkPhos  68  06-11      Urinalysis Basic - ( 2020 00:30 )    Color: COLORLESS / Appearance: CLEAR / S.009 / pH: 6.5  Gluc: NEGATIVE / Ketone: NEGATIVE  / Bili: NEGATIVE / Urobili: NORMAL   Blood: NEGATIVE / Protein: NEGATIVE / Nitrite: NEGATIVE   Leuk Esterase: NEGATIVE / RBC: x / WBC x   Sq Epi: x / Non Sq Epi: x / Bacteria: x      COVID-19 PCR . (20 @ 18:14)    COVID-19 PCR: NotDetec:         RADIOLOGY & ADDITIONAL TESTS:  < from: CT Abdomen and Pelvis w/ Oral Cont (20 @ 14:21) >  FINDINGS:    LOWER CHEST: Within normal limits.     LIVER: Within normal limits.   BILE DUCTS: Normal caliber.  GALLBLADDER: Within normal limits.  SPLEEN: Within normal limits.   PANCREAS: Within normal limits.  ADRENALS: Within normal limits.   KIDNEYS/URETERS: Within normal limits.     BLADDER: Within normal limits.   REPRODUCTIVE ORGANS: Within normal limits.    BOWEL: No bowel obstruction. Nonvisualized appendix.  Status post Nayely-en-Y gastric bypass.   PERITONEUM: No ascites.   VESSELS:  Within normal limits.  RETROPERITONEUM/LYMPH NODES: No lymphadenopathy.     ABDOMINAL WALL: Within normal limits.  BONES: Within normal limits.     IMPRESSION: No bowel obstruction. No internal hernia.

## 2020-06-12 NOTE — PROGRESS NOTE ADULT - SUBJECTIVE AND OBJECTIVE BOX
GENERAL SURGERY DAILY PROGRESS NOTE:    Interval:  HA ovn, received IV tylenol hypertensive ovn received hydralazine    Subjective:  Patient seen and examined this am. Reports pain is well controlled. No other complaints. Denies fever. Denies HA/CP/SOB. Denies N/V/D. +Tolerating diet. +Voiding. +OOB.    Vital Signs Last 24 Hrs  T(C): 31.1 (2020 22:30), Max: 37.1 (2020 19:30)  T(F): 87.9 (2020 22:30), Max: 98.7 (2020 19:30)  HR: 75 (2020 22:30) (64 - 92)  BP: 152/73 (2020 22:30) (146/83 - 194/103)  RR: 19 (2020 22:30) (16 - 19)  SpO2: 98% (2020 22:30) (97% - 100%)    Exam:  General: NAD, well-nourished  HEENT: Atraumatic, EOMI  Resp: Breathing comfortably on RA  CV: Normal sinus rhythm  Abd: soft, ND, minimally tender in R side of abdomen  Ext: ROMIx4, motor strength intact x 4    I&O's Detail      Daily Height in cm: 157.48 (2020 10:39)    Daily     MEDICATIONS  (STANDING):  enoxaparin Injectable 40 milliGRAM(s) SubCutaneous daily  metoprolol tartrate Injectable 5 milliGRAM(s) IV Push every 6 hours  pantoprazole  Injectable 40 milliGRAM(s) IV Push daily  sodium chloride 0.9% 1000 milliLiter(s) (125 mL/Hr) IV Continuous <Continuous>    MEDICATIONS  (PRN):      LABS:                        12.9   5.85  )-----------( 253      ( 2020 00:30 )             42.6     06-11    137  |  102  |  10  ----------------------------<  112<H>  5.1   |  24  |  0.59    Ca    9.1      2020 00:30    TPro  7.0  /  Alb  4.0  /  TBili  0.3  /  DBili  x   /  AST  36<H>  /  ALT  13  /  AlkPhos  68  06-11      Urinalysis Basic - ( 2020 00:30 )    Color: COLORLESS / Appearance: CLEAR / S.009 / pH: 6.5  Gluc: NEGATIVE / Ketone: NEGATIVE  / Bili: NEGATIVE / Urobili: NORMAL   Blood: NEGATIVE / Protein: NEGATIVE / Nitrite: NEGATIVE   Leuk Esterase: NEGATIVE / RBC: x / WBC x   Sq Epi: x / Non Sq Epi: x / Bacteria: x        MILAGRO Bingham, PGY-1  Green Team Surgery  [id number]

## 2020-06-12 NOTE — PROGRESS NOTE ADULT - ASSESSMENT
69F with a pmhx of HTN, and pshx a RYGB + cholecystectomy for weight loss over 10 years ago at an OSH who presents with a 1-day history of abdominal pain transferred from [facility] for CT scan concerning for SBO. In ED patient had repeat CT scan with PO contrast which demonstrated no bowel obstruction.    Plan:  - Serial abdominal exams, no pain meds without exam  - Diet: NPO with sips/Banana Bag  - Home meds: IV Metoprolol (takes home labetalol) and PPI  - DVT ppx: Lovenox    Green Team Surgery  [id number] with any questions 69F with a pmhx of HTN, and pshx a RYGB + cholecystectomy for weight loss over 10 years ago at an OSH who presents with a 1-day history of abdominal pain transferred from [facility] for CT scan concerning for SBO. In ED patient had repeat CT scan with PO contrast which demonstrated no bowel obstruction.    Plan:  - Serial abdominal exams, no pain meds without exam  - Diet: NPO with sips/Banana Bag  - Home meds: IV Metoprolol (takes home labetalol) and PPI  - [facility]    Green Team Surgery  [id number] with any questions    Addendum  Patient seen/examined by MIS/bariatric fellow. Agree with resident note.  Patient complains of a headache this morning.  Abdominal pain has improved and she describes a mild epigastric "soreness" at the moment.  She has been passing a small amount of flatus, most recently this morning.  Denies nausea/vomiting.  Clear liquid diet today.  Shaila Gallegos MD 69F with a pmhx of HTN, and pshx a RYGB + cholecystectomy for weight loss over 10 years ago at an OSH who presents with a 1-day history of abdominal pain transferred from [facility] for CT scan concerning for SBO. In ED patient had repeat CT scan with PO contrast which demonstrated no bowel obstruction.    Plan:  - Serial abdominal exams, no pain meds without exam  - Diet: NPO with sips/Banana Bag  - Home meds: IV Metoprolol (takes home labetalol) and PPI  - DVT ppx: Lovex    Green Team Surgery  [id number] with any questions    Addendum  Patient seen/examined by MIS/bariatric fellow. Agree with resident note.  Patient complains of a headache this morning.  Abdominal pain has improved and she describes a mild epigastric "soreness" at the moment.  She has been passing a small amount of flatus, most recently this morning.  CT shows the patient is not obstructed, but some thickening around her jejunojejunostomy.  GI consult for EGD today.  Shaila Gallegos MD

## 2020-06-13 ENCOUNTER — TRANSCRIPTION ENCOUNTER (OUTPATIENT)
Age: 70
End: 2020-06-13

## 2020-06-13 VITALS
RESPIRATION RATE: 18 BRPM | DIASTOLIC BLOOD PRESSURE: 86 MMHG | TEMPERATURE: 98 F | OXYGEN SATURATION: 95 % | HEART RATE: 84 BPM | SYSTOLIC BLOOD PRESSURE: 159 MMHG

## 2020-06-13 LAB
ANION GAP SERPL CALC-SCNC: 11 MMOL/L — SIGNIFICANT CHANGE UP (ref 5–17)
BUN SERPL-MCNC: 8 MG/DL — SIGNIFICANT CHANGE UP (ref 7–23)
CALCIUM SERPL-MCNC: 9 MG/DL — SIGNIFICANT CHANGE UP (ref 8.4–10.5)
CHLORIDE SERPL-SCNC: 102 MMOL/L — SIGNIFICANT CHANGE UP (ref 96–108)
CO2 SERPL-SCNC: 26 MMOL/L — SIGNIFICANT CHANGE UP (ref 22–31)
CREAT SERPL-MCNC: 0.73 MG/DL — SIGNIFICANT CHANGE UP (ref 0.5–1.3)
GLUCOSE SERPL-MCNC: 91 MG/DL — SIGNIFICANT CHANGE UP (ref 70–99)
HCT VFR BLD CALC: 41 % — SIGNIFICANT CHANGE UP (ref 34.5–45)
HGB BLD-MCNC: 12.8 G/DL — SIGNIFICANT CHANGE UP (ref 11.5–15.5)
HIV 1+2 AB+HIV1 P24 AG SERPL QL IA: SIGNIFICANT CHANGE UP
MAGNESIUM SERPL-MCNC: 2.2 MG/DL — SIGNIFICANT CHANGE UP (ref 1.6–2.6)
MCHC RBC-ENTMCNC: 27.6 PG — SIGNIFICANT CHANGE UP (ref 27–34)
MCHC RBC-ENTMCNC: 31.2 GM/DL — LOW (ref 32–36)
MCV RBC AUTO: 88.6 FL — SIGNIFICANT CHANGE UP (ref 80–100)
NRBC # BLD: 0 /100 WBCS — SIGNIFICANT CHANGE UP (ref 0–0)
PHOSPHATE SERPL-MCNC: 3.1 MG/DL — SIGNIFICANT CHANGE UP (ref 2.5–4.5)
PLATELET # BLD AUTO: 238 K/UL — SIGNIFICANT CHANGE UP (ref 150–400)
POTASSIUM SERPL-MCNC: 3.5 MMOL/L — SIGNIFICANT CHANGE UP (ref 3.5–5.3)
POTASSIUM SERPL-SCNC: 3.5 MMOL/L — SIGNIFICANT CHANGE UP (ref 3.5–5.3)
RBC # BLD: 4.63 M/UL — SIGNIFICANT CHANGE UP (ref 3.8–5.2)
RBC # FLD: 13.3 % — SIGNIFICANT CHANGE UP (ref 10.3–14.5)
SODIUM SERPL-SCNC: 139 MMOL/L — SIGNIFICANT CHANGE UP (ref 135–145)
WBC # BLD: 3.68 K/UL — LOW (ref 3.8–10.5)
WBC # FLD AUTO: 3.68 K/UL — LOW (ref 3.8–10.5)

## 2020-06-13 PROCEDURE — 86704 HEP B CORE ANTIBODY TOTAL: CPT

## 2020-06-13 PROCEDURE — 74240 X-RAY XM UPR GI TRC 1CNTRST: CPT

## 2020-06-13 PROCEDURE — 87522 HEPATITIS C REVRS TRNSCRPJ: CPT

## 2020-06-13 PROCEDURE — 86803 HEPATITIS C AB TEST: CPT

## 2020-06-13 PROCEDURE — 86708 HEPATITIS A ANTIBODY: CPT

## 2020-06-13 PROCEDURE — 74248 X-RAY SM INT F-THRU STD: CPT | Mod: 26

## 2020-06-13 PROCEDURE — 80048 BASIC METABOLIC PNL TOTAL CA: CPT

## 2020-06-13 PROCEDURE — 85027 COMPLETE CBC AUTOMATED: CPT

## 2020-06-13 PROCEDURE — 88312 SPECIAL STAINS GROUP 1: CPT

## 2020-06-13 PROCEDURE — 87521 HEPATITIS C PROBE&RVRS TRNSC: CPT

## 2020-06-13 PROCEDURE — 87340 HEPATITIS B SURFACE AG IA: CPT

## 2020-06-13 PROCEDURE — 99222 1ST HOSP IP/OBS MODERATE 55: CPT

## 2020-06-13 PROCEDURE — 88305 TISSUE EXAM BY PATHOLOGIST: CPT

## 2020-06-13 PROCEDURE — 84100 ASSAY OF PHOSPHORUS: CPT

## 2020-06-13 PROCEDURE — 99285 EMERGENCY DEPT VISIT HI MDM: CPT | Mod: 25

## 2020-06-13 PROCEDURE — 74176 CT ABD & PELVIS W/O CONTRAST: CPT

## 2020-06-13 PROCEDURE — 96374 THER/PROPH/DIAG INJ IV PUSH: CPT

## 2020-06-13 PROCEDURE — 74240 X-RAY XM UPR GI TRC 1CNTRST: CPT | Mod: 26

## 2020-06-13 PROCEDURE — 87389 HIV-1 AG W/HIV-1&-2 AB AG IA: CPT

## 2020-06-13 PROCEDURE — 83735 ASSAY OF MAGNESIUM: CPT

## 2020-06-13 PROCEDURE — 86706 HEP B SURFACE ANTIBODY: CPT

## 2020-06-13 PROCEDURE — 74248 X-RAY SM INT F-THRU STD: CPT

## 2020-06-13 RX ORDER — LABETALOL HCL 100 MG
200 TABLET ORAL
Refills: 0 | Status: DISCONTINUED | OUTPATIENT
Start: 2020-06-13 | End: 2020-06-13

## 2020-06-13 RX ORDER — LISINOPRIL 2.5 MG/1
20 TABLET ORAL DAILY
Refills: 0 | Status: DISCONTINUED | OUTPATIENT
Start: 2020-06-13 | End: 2020-06-13

## 2020-06-13 RX ORDER — HYDROCHLOROTHIAZIDE 25 MG
12.5 TABLET ORAL DAILY
Refills: 0 | Status: DISCONTINUED | OUTPATIENT
Start: 2020-06-13 | End: 2020-06-13

## 2020-06-13 RX ORDER — HYDRALAZINE HCL 50 MG
10 TABLET ORAL ONCE
Refills: 0 | Status: DISCONTINUED | OUTPATIENT
Start: 2020-06-13 | End: 2020-06-13

## 2020-06-13 RX ADMIN — ENOXAPARIN SODIUM 40 MILLIGRAM(S): 100 INJECTION SUBCUTANEOUS at 12:16

## 2020-06-13 RX ADMIN — LISINOPRIL 20 MILLIGRAM(S): 2.5 TABLET ORAL at 16:29

## 2020-06-13 RX ADMIN — Medication 5 MILLIGRAM(S): at 00:20

## 2020-06-13 RX ADMIN — Medication 12.5 MILLIGRAM(S): at 16:29

## 2020-06-13 RX ADMIN — Medication 5 MILLIGRAM(S): at 05:43

## 2020-06-13 RX ADMIN — PANTOPRAZOLE SODIUM 40 MILLIGRAM(S): 20 TABLET, DELAYED RELEASE ORAL at 12:16

## 2020-06-13 RX ADMIN — Medication 5 MILLIGRAM(S): at 12:16

## 2020-06-13 RX ADMIN — Medication 200 MILLIGRAM(S): at 17:17

## 2020-06-13 NOTE — PROGRESS NOTE ADULT - SUBJECTIVE AND OBJECTIVE BOX
Interval events:  - Underwent EGD which demonstrated edema and erythema at gastrojejunostmoy    S: Patient doing well, tolerated CLD post-procedure, now NPO for UGI study this morning, no nausea, no abdominal pain. Denies passing flatus or BM.    O: Vital Signs  T(C): 37.2 (06-12 @ 21:20), Max: 37.2 (06-12 @ 21:20)  HR: 69 (06-12 @ 21:20) (58 - 69)  BP: 172/75 (06-12 @ 21:20) (145/79 - 173/93)  RR: 18 (06-12 @ 21:20) (18 - 19)  SpO2: 96% (06-12 @ 21:20) (96% - 98%)  06-11-20 @ 07:01  -  06-12-20 @ 07:00  --------------------------------------------------------  IN: 0 mL / OUT: 1000 mL / NET: -1000 mL    06-12-20 @ 07:01  -  06-13-20 @ 00:30  --------------------------------------------------------  IN: 400 mL / OUT: 350 mL / NET: 50 mL      General: alert and oriented, NAD  Resp: airway patent, respirations unlabored  CVS: regular rate and rhythm  Abdomen: soft, nontender, nondistended  Extremities: no edema  Skin: warm, dry, appropriate color                          12.8   4.74  )-----------( 229      ( 12 Jun 2020 06:59 )             42.0   06-12    137  |  101  |  8   ----------------------------<  110<H>  3.8   |  24  |  0.57    Ca    8.8      12 Jun 2020 06:59  Phos  2.9     06-12  Mg     2.0     06-12

## 2020-06-13 NOTE — CONSULT NOTE ADULT - ASSESSMENT
69F htn, hx RYGB 10 years ago.  Now with possible SBO that has resolved.  Incidentally noted to have hepatitis C antibody.  Not clear if she has active infection though doubtful.  She has normal liver tests.  CT did not show cirrhosis.  Likely she cleared on her own.  Would however, test hepatitis C RNA, hepatitis A/B in case she needs to be vaccinated should she have active hepC and HIV tests.  I have ordered these tests for tomorrow morning.  She can f/u with ID as outpatient.    I have discussed plan of care as detailed above with consulting team     Please call the ID service [phone] with questions or concerns over the weekend

## 2020-06-13 NOTE — DISCHARGE NOTE PROVIDER - NSDCMRMEDTOKEN_GEN_ALL_CORE_FT
labetalol 200 mg oral tablet: 1 tab(s) orally 2 times a day  lisinopril-hydrochlorothiazide 20 mg-12.5 mg oral tablet: 1 tab(s) orally once a day  omeprazole 20 mg oral delayed release capsule: 1 cap(s) orally once a day  pantoprazole 40 mg oral delayed release tablet: 1 tab(s) orally once a day (before a meal)

## 2020-06-13 NOTE — DISCHARGE NOTE NURSING/CASE MANAGEMENT/SOCIAL WORK - PATIENT PORTAL LINK FT
You can access the FollowMyHealth Patient Portal offered by [facility] by registering at the following website: [email or web address]. By joining [employer]’s FollowMyHealth portal, you will also be able to view your health information using other applications (apps) compatible with our system.

## 2020-06-13 NOTE — DISCHARGE NOTE PROVIDER - CARE PROVIDERS DIRECT ADDRESSES
[email or web address]

## 2020-06-13 NOTE — PROGRESS NOTE ADULT - ASSESSMENT
69F with a pmhx of HTN, and pshx a RYGB + cholecystectomy for weight loss over 10 years ago at an OSH who presents with a 1-day history of abdominal pain transferred from [facility] for CT scan concerning for SBO. In ED patient had repeat CT scan with PO contrast which demonstrated no bowel obstruction. However patient with persistent nausea and small volume spit-ups this morning. EGD yesterday demonstrating antral gastritis (biopsied), patent gastrojejunostomy with erythema and edema.    Plan:  - NPO for Upper GI Series with Small Bowel Follow Through in AM  - Continue Home meds: IV Metoprolol (takes home labetalol) and PPI  - DVT ppx: Lovenox    Green Team Surgery  [id number].

## 2020-06-13 NOTE — CONSULT NOTE ADULT - SUBJECTIVE AND OBJECTIVE BOX
Traci Capellan - [phone]    Patient is a 69y old  Female who presents with a chief complaint of abdominal pain (12 Jun 2020 09:25)    HPI:  69F with HTN, hx RYGB + cholecystectomy for weight loss over 10 years ago at an OSH.  Initially presented to [facility] on 6/11 with abdominal pain.  CT concerning for SBO.  CT repeat and did not show obstruction.  EGD done.  results noted.  Admission labs included HepC Ab which was weakly positive and ID consulted.    No fever.  No chills.  Denies IVDU or other drugs.  Tried to donate blood a few years ago and was told she was not able to but she did not know why.  Does not recall if she ever had an HIV test or hepatitis B serologies.      prior hospital charts reviewed [ x ]  primary team notes reviewed [x ]  other consultant notes reviewed [  ]    PAST MEDICAL & SURGICAL HISTORY:  Hypertension  Hypothyroidism  HTN (hypertension)  H/O gastric bypass    Allergies  Drug Allergies Not Recorded  Seafood (Anaphylaxis)    ANTIMICROBIALS:  none    MEDICATIONS  (STANDING):  enoxaparin Injectable 40 daily  metoprolol tartrate Injectable 5 every 6 hours  pantoprazole  Injectable 40 daily    SOCIAL HISTORY:  denies IVDU or other drugs,     FAMILY HISTORY:  no FH liver disease    REVIEW OF SYSTEMS  [  ] ROS unobtainable because:    [ x ] All other systems negative except as noted below:	    Constitutional:  [ ] fever [ ] chills  [ ] weight loss  [ ] weakness  Skin:  [ ] rash [ ] phlebitis	  Eyes: [ ] icterus [ ] pain  [ ] discharge	  ENMT: [ ] sore throat  [ ] thrush [ ] ulcers [ ] exudates  Respiratory: [ ] dyspnea [ ] hemoptysis [ ] cough [ ] sputum	  Cardiovascular:  [ ] chest pain [ ] palpitations [ ] edema	  Gastrointestinal:  [ ] nausea [ ] vomiting [ ] diarrhea [ ] constipation [ x] pain (resolved, +flatus)	  Genitourinary:  [ ] dysuria [ ] frequency [ ] hematuria [ ] discharge [ ] flank pain  [ ] incontinence  Musculoskeletal:  [ ] myalgias [ ] arthralgias [ ] arthritis  [ ] back pain  Neurological:  [ ] headache [ ] seizures  [ ] confusion/altered mental status  Psychiatric:  [ ] anxiety [ ] depression	  Hematology/Lymphatics:  [ ] lymphadenopathy  Endocrine:  [ ] adrenal [ ] thyroid  Allergic/Immunologic:	 [ ] transplant [ ] seasonal    Vital Signs Last 24 Hrs  T(F): 98 (06-13-20 @ 08:55), Max: 99.2 (06-10-20 @ 22:28)  Vital Signs Last 24 Hrs  HR: 62 (06-13-20 @ 12:12) (62 - 69)  BP: 176/107 (06-13-20 @ 12:12) (135/75 - 176/107)  RR: 18 (06-13-20 @ 08:55)  SpO2: 97% (06-13-20 @ 08:55) (95% - 97%)  Wt(kg): --    PHYSICAL EXAM:  Constitutional: non-toxic, no distress  HEAD/EYES: anicteric  ENT:  supple, no thrush  Cardiovascular:   normal S1, S2  Respiratory:  clear BS bilaterally,  GI:  soft, non-tender, normal bowel sounds  :  no villa  Musculoskeletal:  no synovitis  Neurologic: awake and alert, normal strength, no focal findings  Skin:  no rash, no erythema, no phlebitis  Psychiatric:  awake, alert, appropriate mood                        12.8   3.68  )-----------( 238      ( 13 Jun 2020 07:45 )             41.0     139  |  102  |  8   ----------------------------<  91  3.5   |  26  |  0.73    Ca    9.0      13 Jun 2020 07:45  Phos  3.1     06-13  Mg     2.2     06-13    MICROBIOLOGY:  Culture - Urine (collected 11 Jun 2020 00:48)  Source: .Urine Clean Catch (Midstream)  Final Report (11 Jun 2020 21:31):    <10,000 CFU/mL Normal Urogenital Vickie    Hepatitis C Antibody Test (06.12.20 @ 08:23)    Hepatitis C Virus S/CO Ratio: 2.63: Test repeated. S/CO    Hepatitis C Virus Interpretation: Weakly Reactive Hepatitis C AB    RADIOLOGY:  imaging below personally reviewed and agree with findings    CT Abdomen and Pelvis w/ Oral Cont (06.11.20 @ 14:21) >  IMPRESSION: No bowel obstruction. No internal hernia.    CT Abdomen and Pelvis w/ IV Cont (06.11.20 @ 04:24) >  IMPRESSION:   Small bowel obstruction with transition point just distal to the small bowel anastomosis. There is wall thickening of the bowel just proximal to the transition point. Recommend clinical correlation with lactate level.

## 2020-06-13 NOTE — DISCHARGE NOTE PROVIDER - HOSPITAL COURSE
6/11: 69F with a pmhx of HTN, and pshx a RYGB + cholecystectomy for weight loss over 10 years ago at an OSH who presents with a 1-day history of abdominal pain transferred from [facility] for CT scan concerning for SBO. The patient reports waxing and waning symptoms over the last year that have all been self-limited. This episode began yesterday morning and failed to improve despite usual measures of limiting oral intake, and inducing BMs with valsalva/straining. She describes a gnawing, crampy pain in the mid-epigastric and RUQ, but intermittently diffuse. The patient report some nausea but without vomiting; episodes of retching produced scant sputum. She denies any associated fevers, chills, dyspnea or localizing signs. Her last BM was yesterday morning around 0900, and her last flatus was this AM around 0700. Reports she had an interval upper endoscopy 4 years ago which was normal. In ED patient had repeat CT scan with PO contrast which demonstrated no bowel obstruction.        6/12: EGD patent gj erythema in gastric pouch w was bxd         6/13: UGIS w SBFT no leak no obstruction        At time of dc pt clinically and hemodynamically stable

## 2020-06-13 NOTE — DISCHARGE NOTE PROVIDER - CARE PROVIDER_API CALL
Kevin Coombs  SURGERY  [address] [place] [address]  Phone: [phone]  Fax: [phone]  Follow Up Time:     Traci Capellan  INFECTIOUS DISEASE  [address] [place] [id number]  Phone: [phone]  Fax: [phone]  Follow Up Time:

## 2020-06-13 NOTE — PROGRESS NOTE ADULT - ATTENDING COMMENTS
I have seen and examined the patient. I agree with the above surgery resident's note.  UGI today
Pt seen and examined  Agree with note which was reviewed and edited where appropriate.  D/W patient, RN, residents and Fellow

## 2020-06-14 LAB
HAV IGG SER QL IA: REACTIVE
HBV CORE AB SER-ACNC: SIGNIFICANT CHANGE UP
HBV SURFACE AB SER-ACNC: <3 MIU/ML — LOW
HBV SURFACE AG SER-ACNC: SIGNIFICANT CHANGE UP

## 2020-06-15 LAB
HCV RNA SERPL NAA DL=5-ACNC: SIGNIFICANT CHANGE UP IU/ML
HCV RNA SPEC NAA+PROBE-LOG IU: SIGNIFICANT CHANGE UP LOG10IU/ML

## 2020-07-30 PROBLEM — I10 ESSENTIAL (PRIMARY) HYPERTENSION: Chronic | Status: ACTIVE | Noted: 2020-06-10

## 2020-09-15 ENCOUNTER — LABORATORY RESULT (OUTPATIENT)
Age: 70
End: 2020-09-15

## 2020-09-15 ENCOUNTER — OUTPATIENT (OUTPATIENT)
Dept: OUTPATIENT SERVICES | Facility: HOSPITAL | Age: 70
LOS: 1 days | End: 2020-09-15

## 2020-09-15 ENCOUNTER — APPOINTMENT (OUTPATIENT)
Dept: INTERNAL MEDICINE | Facility: CLINIC | Age: 70
End: 2020-09-15
Payer: MEDICARE

## 2020-09-15 VITALS
RESPIRATION RATE: 20 BRPM | BODY MASS INDEX: 34.49 KG/M2 | SYSTOLIC BLOOD PRESSURE: 142 MMHG | OXYGEN SATURATION: 100 % | DIASTOLIC BLOOD PRESSURE: 100 MMHG | WEIGHT: 202 LBS | HEIGHT: 64 IN | HEART RATE: 72 BPM

## 2020-09-15 DIAGNOSIS — Z82.49 FAMILY HISTORY OF ISCHEMIC HEART DISEASE AND OTHER DISEASES OF THE CIRCULATORY SYSTEM: ICD-10-CM

## 2020-09-15 DIAGNOSIS — Z00.00 ENCOUNTER FOR GENERAL ADULT MEDICAL EXAMINATION W/OUT ABNORMAL FINDINGS: ICD-10-CM

## 2020-09-15 DIAGNOSIS — G45.9 TRANSIENT CEREBRAL ISCHEMIC ATTACK, UNSPECIFIED: ICD-10-CM

## 2020-09-15 DIAGNOSIS — Z98.84 BARIATRIC SURGERY STATUS: Chronic | ICD-10-CM

## 2020-09-15 LAB
CALCIUM SERPL-MCNC: 9.4 MG/DL — SIGNIFICANT CHANGE UP (ref 8.4–10.5)
CHOLEST SERPL-MCNC: 160 MG/DL — SIGNIFICANT CHANGE UP (ref 120–199)
HBA1C BLD-MCNC: 5.3 % — SIGNIFICANT CHANGE UP (ref 4–5.6)
HDLC SERPL-MCNC: 52 MG/DL — SIGNIFICANT CHANGE UP (ref 45–65)
LIPID PNL WITH DIRECT LDL SERPL: 100 MG/DL — SIGNIFICANT CHANGE UP
PHOSPHATE SERPL-MCNC: 3 MG/DL — SIGNIFICANT CHANGE UP (ref 2.5–4.5)
TRIGL SERPL-MCNC: 81 MG/DL — SIGNIFICANT CHANGE UP (ref 10–149)

## 2020-09-15 PROCEDURE — 99204 OFFICE O/P NEW MOD 45 MIN: CPT | Mod: GC

## 2020-09-15 NOTE — REVIEW OF SYSTEMS
[Fever] : no fever [Chills] : no chills [Fatigue] : no fatigue [Pain] : no pain [Earache] : no earache [Redness] : no redness [Vision Problems] : no vision problems [Hearing Loss] : no hearing loss [Nosebleed] : no nosebleeds [Hoarseness] : no hoarseness [Nasal Discharge] : no nasal discharge [Sore Throat] : no sore throat [Chest Pain] : no chest pain [Palpitations] : no palpitations [Leg Claudication] : no leg claudication [Shortness Of Breath] : no shortness of breath [Wheezing] : no wheezing [Cough] : no cough [Abdominal Pain] : no abdominal pain [Nausea] : no nausea [Constipation] : no constipation [Diarrhea] : diarrhea [Melena] : no melena [Dysuria] : no dysuria [Hematuria] : no hematuria [Joint Pain] : no joint pain [Joint Stiffness] : no joint stiffness [Joint Swelling] : no joint swelling [Muscle Weakness] : no muscle weakness [Muscle Pain] : no muscle pain [Itching] : no itching [Skin Rash] : no skin rash [Dizziness] : no dizziness

## 2020-09-15 NOTE — HEALTH RISK ASSESSMENT
[Yes] : Yes [Monthly or less (1 pt)] : Monthly or less (1 point) [Two or more falls in past year] : Patient reported two or more falls in the past year [0] : 2) Feeling down, depressed, or hopeless: Not at all (0) [With Significant Other] : lives with significant other [With Family] : lives with family [Retired] : retired [] :  [Feels Safe at Home] : Feels safe at home [# Of Children ___] : has [unfilled] children [Fully functional (bathing, dressing, toileting, transferring, walking, feeding)] : Fully functional (bathing, dressing, toileting, transferring, walking, feeding) [Fully functional (using the telephone, shopping, preparing meals, housekeeping, doing laundry, using] : Fully functional and needs no help or supervision to perform IADLs (using the telephone, shopping, preparing meals, housekeeping, doing laundry, using transportation, managing medications and managing finances) [] : No [de-identified] : Social drinker [de-identified] : Carbohydrates [de-identified] : Limited [MammogramComments] : 10 years ago:normal  [ColonoscopyComments] : 6 years ago: normal  [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

## 2020-09-15 NOTE — ASSESSMENT
[FreeTextEntry1] : #HCM\par -Per patient, colonoscopy ~6 years ago did not show any concern for polyps. \par -Patient has not had a mammogram for at least 10 years. Will send for mammogram referral. \par - Do not currently have high-dose influenza vaccine in-office; will administer at follow-up visit. \par - Will obtain Lipid panel , A1c, Phosphorous\par - Patient unsure about immunization history; will send for MMR titers, varicella titers, and tetanus titers. Will also send for HIV. \par - Patient will also request records from, prior PCP,  Dr. Hamilton's office. \par \par \par RTC: 3 months for follow-up after referral appointments.

## 2020-09-15 NOTE — PHYSICAL EXAM
[No Acute Distress] : no acute distress [Well Nourished] : well nourished [Well Developed] : well developed [Well-Appearing] : well-appearing [Normal Sclera/Conjunctiva] : normal sclera/conjunctiva [Normal Outer Ear/Nose] : the outer ears and nose were normal in appearance [PERRL] : pupils equal round and reactive to light [EOMI] : extraocular movements intact [Normal Oropharynx] : the oropharynx was normal [No Lymphadenopathy] : no lymphadenopathy [Supple] : supple [No Accessory Muscle Use] : no accessory muscle use [No Respiratory Distress] : no respiratory distress  [Normal Rate] : normal rate  [Clear to Auscultation] : lungs were clear to auscultation bilaterally [Regular Rhythm] : with a regular rhythm [Normal S1, S2] : normal S1 and S2 [No Carotid Bruits] : no carotid bruits [No Abdominal Bruit] : a ~M bruit was not heard ~T in the abdomen [No Palpable Aorta] : no palpable aorta [Soft] : abdomen soft [Non Tender] : non-tender [No CVA Tenderness] : no CVA  tenderness [No Spinal Tenderness] : no spinal tenderness [Grossly Normal Strength/Tone] : grossly normal strength/tone [Thyroid Normal, No Nodules] : the thyroid was normal and there were no nodules present [Normal Posterior Cervical Nodes] : no posterior cervical lymphadenopathy [Normal Bowel Sounds] : normal bowel sounds [No HSM] : no HSM [Normal Anterior Cervical Nodes] : no anterior cervical lymphadenopathy [No Focal Deficits] : no focal deficits [Coordination Grossly Intact] : coordination grossly intact [Normal Insight/Judgement] : insight and judgment were intact [Normal Affect] : the affect was normal [de-identified] : More cerumen in right ear compared to left [de-identified] : R [de-identified] : Right knee swollen compared to left; non-edematous
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
## 2020-09-17 LAB — TETANUS ANTIBODIES IGG: 2.27 IU/ML — SIGNIFICANT CHANGE UP

## 2020-09-18 LAB — MISCELLANEOUS - CHEM: SIGNIFICANT CHANGE UP

## 2020-09-21 LAB — VZV IGG SER IF-ACNC: SIGNIFICANT CHANGE UP

## 2021-02-03 ENCOUNTER — RESULT CHARGE (OUTPATIENT)
Age: 71
End: 2021-02-03

## 2021-02-04 ENCOUNTER — OUTPATIENT (OUTPATIENT)
Dept: OUTPATIENT SERVICES | Facility: HOSPITAL | Age: 71
LOS: 1 days | End: 2021-02-04

## 2021-02-04 ENCOUNTER — RESULT REVIEW (OUTPATIENT)
Age: 71
End: 2021-02-04

## 2021-02-04 ENCOUNTER — APPOINTMENT (OUTPATIENT)
Dept: INTERNAL MEDICINE | Facility: CLINIC | Age: 71
End: 2021-02-04
Payer: MEDICARE

## 2021-02-04 ENCOUNTER — NON-APPOINTMENT (OUTPATIENT)
Age: 71
End: 2021-02-04

## 2021-02-04 VITALS
OXYGEN SATURATION: 99 % | SYSTOLIC BLOOD PRESSURE: 130 MMHG | HEART RATE: 80 BPM | HEIGHT: 64 IN | WEIGHT: 208 LBS | DIASTOLIC BLOOD PRESSURE: 100 MMHG | BODY MASS INDEX: 35.51 KG/M2

## 2021-02-04 VITALS — TEMPERATURE: 97.2 F

## 2021-02-04 DIAGNOSIS — Z98.84 BARIATRIC SURGERY STATUS: Chronic | ICD-10-CM

## 2021-02-04 DIAGNOSIS — Z80.49 FAMILY HISTORY OF MALIGNANT NEOPLASM OF OTHER GENITAL ORGANS: ICD-10-CM

## 2021-02-04 LAB
A1C WITH ESTIMATED AVERAGE GLUCOSE RESULT: 5.3 % — SIGNIFICANT CHANGE UP (ref 4–5.6)
ANION GAP SERPL CALC-SCNC: 7 MMOL/L — SIGNIFICANT CHANGE UP (ref 7–14)
BASOPHILS # BLD AUTO: 0.02 K/UL — SIGNIFICANT CHANGE UP (ref 0–0.2)
BASOPHILS NFR BLD AUTO: 0.5 % — SIGNIFICANT CHANGE UP (ref 0–2)
BUN SERPL-MCNC: 13 MG/DL — SIGNIFICANT CHANGE UP (ref 7–23)
CALCIUM SERPL-MCNC: 9.5 MG/DL — SIGNIFICANT CHANGE UP (ref 8.4–10.5)
CHLORIDE SERPL-SCNC: 103 MMOL/L — SIGNIFICANT CHANGE UP (ref 98–107)
CO2 SERPL-SCNC: 30 MMOL/L — SIGNIFICANT CHANGE UP (ref 22–31)
CREAT SERPL-MCNC: 0.69 MG/DL — SIGNIFICANT CHANGE UP (ref 0.5–1.3)
EOSINOPHIL # BLD AUTO: 0.12 K/UL — SIGNIFICANT CHANGE UP (ref 0–0.5)
EOSINOPHIL NFR BLD AUTO: 2.9 % — SIGNIFICANT CHANGE UP (ref 0–6)
ESTIMATED AVERAGE GLUCOSE: 105 MG/DL — SIGNIFICANT CHANGE UP (ref 68–114)
FERRITIN SERPL-MCNC: 31 NG/ML — SIGNIFICANT CHANGE UP (ref 15–150)
FOLATE SERPL-MCNC: 20 NG/ML — HIGH (ref 3.1–17.5)
GLUCOSE SERPL-MCNC: 86 MG/DL — SIGNIFICANT CHANGE UP (ref 70–99)
HCT VFR BLD CALC: 43 % — SIGNIFICANT CHANGE UP (ref 34.5–45)
HGB BLD-MCNC: 12.8 G/DL — SIGNIFICANT CHANGE UP (ref 11.5–15.5)
IANC: 1.81 K/UL — SIGNIFICANT CHANGE UP (ref 1.5–8.5)
IMM GRANULOCYTES NFR BLD AUTO: 0.2 % — SIGNIFICANT CHANGE UP (ref 0–1.5)
IRON SATN MFR SERPL: 23 % — SIGNIFICANT CHANGE UP (ref 14–50)
IRON SATN MFR SERPL: 80 UG/DL — SIGNIFICANT CHANGE UP (ref 30–160)
LYMPHOCYTES # BLD AUTO: 2 K/UL — SIGNIFICANT CHANGE UP (ref 1–3.3)
LYMPHOCYTES # BLD AUTO: 48.1 % — HIGH (ref 13–44)
MCHC RBC-ENTMCNC: 27 PG — SIGNIFICANT CHANGE UP (ref 27–34)
MCHC RBC-ENTMCNC: 29.8 GM/DL — LOW (ref 32–36)
MCV RBC AUTO: 90.7 FL — SIGNIFICANT CHANGE UP (ref 80–100)
MONOCYTES # BLD AUTO: 0.2 K/UL — SIGNIFICANT CHANGE UP (ref 0–0.9)
MONOCYTES NFR BLD AUTO: 4.8 % — SIGNIFICANT CHANGE UP (ref 2–14)
NEUTROPHILS # BLD AUTO: 1.81 K/UL — SIGNIFICANT CHANGE UP (ref 1.8–7.4)
NEUTROPHILS NFR BLD AUTO: 43.5 % — SIGNIFICANT CHANGE UP (ref 43–77)
NRBC # BLD: 0 /100 WBCS — SIGNIFICANT CHANGE UP
NRBC # FLD: 0 K/UL — SIGNIFICANT CHANGE UP
PLATELET # BLD AUTO: 278 K/UL — SIGNIFICANT CHANGE UP (ref 150–400)
POTASSIUM SERPL-MCNC: 4.1 MMOL/L — SIGNIFICANT CHANGE UP (ref 3.5–5.3)
POTASSIUM SERPL-SCNC: 4.1 MMOL/L — SIGNIFICANT CHANGE UP (ref 3.5–5.3)
RBC # BLD: 4.74 M/UL — SIGNIFICANT CHANGE UP (ref 3.8–5.2)
RBC # FLD: 13.2 % — SIGNIFICANT CHANGE UP (ref 10.3–14.5)
SODIUM SERPL-SCNC: 140 MMOL/L — SIGNIFICANT CHANGE UP (ref 135–145)
TIBC SERPL-MCNC: 344 UG/DL — SIGNIFICANT CHANGE UP (ref 220–430)
UIBC SERPL-MCNC: 264 UG/DL — SIGNIFICANT CHANGE UP (ref 110–370)
VIT B12 SERPL-MCNC: 293 PG/ML — SIGNIFICANT CHANGE UP (ref 200–900)
WBC # BLD: 4.16 K/UL — SIGNIFICANT CHANGE UP (ref 3.8–10.5)
WBC # FLD AUTO: 4.16 K/UL — SIGNIFICANT CHANGE UP (ref 3.8–10.5)

## 2021-02-04 PROCEDURE — 99214 OFFICE O/P EST MOD 30 MIN: CPT | Mod: GC

## 2021-02-04 RX ORDER — ASPIRIN 81 MG
6.5 TABLET, DELAYED RELEASE (ENTERIC COATED) ORAL
Qty: 1 | Refills: 0 | Status: DISCONTINUED | COMMUNITY
Start: 2020-09-15 | End: 2021-02-04

## 2021-02-04 RX ORDER — MULTIVIT-MIN/IRON FUM/FOLIC AC 7.5 MG-4
TABLET ORAL DAILY
Qty: 90 | Refills: 1 | Status: ACTIVE | COMMUNITY
Start: 2021-02-04 | End: 1900-01-01

## 2021-02-05 ENCOUNTER — NON-APPOINTMENT (OUTPATIENT)
Age: 71
End: 2021-02-05

## 2021-02-05 DIAGNOSIS — I10 ESSENTIAL (PRIMARY) HYPERTENSION: ICD-10-CM

## 2021-02-05 DIAGNOSIS — K21.9 GASTRO-ESOPHAGEAL REFLUX DISEASE WITHOUT ESOPHAGITIS: ICD-10-CM

## 2021-02-05 DIAGNOSIS — J45.909 UNSPECIFIED ASTHMA, UNCOMPLICATED: ICD-10-CM

## 2021-02-05 DIAGNOSIS — Z98.84 BARIATRIC SURGERY STATUS: ICD-10-CM

## 2021-02-05 DIAGNOSIS — Z80.0 FAMILY HISTORY OF MALIGNANT NEOPLASM OF DIGESTIVE ORGANS: ICD-10-CM

## 2021-02-05 DIAGNOSIS — M54.5 LOW BACK PAIN: ICD-10-CM

## 2021-02-05 DIAGNOSIS — Z80.49 FAMILY HISTORY OF MALIGNANT NEOPLASM OF OTHER GENITAL ORGANS: ICD-10-CM

## 2021-02-05 DIAGNOSIS — R07.9 CHEST PAIN, UNSPECIFIED: ICD-10-CM

## 2021-02-05 DIAGNOSIS — R20.0 ANESTHESIA OF SKIN: ICD-10-CM

## 2021-02-05 LAB — 24R-OH-CALCIDIOL SERPL-MCNC: 33.1 NG/ML — SIGNIFICANT CHANGE UP (ref 30–80)

## 2021-02-05 NOTE — END OF VISIT
[] : Resident [FreeTextEntry3] : 70-year-old woman with a history of Nayely-en-Y gastric bypass, cholecystectomy, GERD who comes in complaining of chest pain in the center of her chest radiating through to her back which is intermittent but daily and alleviated by Aleve.  Notes no rectal bleeding or melena.  Pain is severe at times.  Notes that her heartburn pain feels very different and is a burning pain slightly lower down closer to her upper abdomen.  She also has back pain radiating down her leg with occasional leg numbness which has not changed since her last visit.  She does occasionally miss doses of her medications.  She is on labetalol 200 mg twice daily and lisinopril–hydrochlorothiazide 20-12.5 mg daily for hypertension.  Occasionally has headaches.  Creatinine normal in June 2020.  Blood pressure today is 130/100.  BMI 35.  Well-appearing without apparent distress.  No leg edema.  Lungs are clear.  Regular rate and rhythm.  EKG normal sinus and without signs of any acute ischemia. \par Given atypical nature of chest pain and age, she remains an intermediate risk for coronary artery disease.  Will send for pharmacologic nuclear stress.  Given intermittent nature of pain, less likely to be dissection.  She differentiates it from her GERD symptoms which she also has.  Does not have any signs of bleeding to suggest PUD.  Will check CBC at this time.  Advised that if she develops very severe or persistent pain that she should present to the ED.  She verbalized understanding of plan.  In the meantime, we will stop using naproxen given her history of Nayely-en-Y gastric bypass and high risk for marginal ulcers.\par Discussed together multiple strategies to improve medication adherence and to not forget taking her medications.  She opted to prepare her medications into a pillbox as well as setting a repetitive alarm on her phone twice daily to alert her to take her medications.  Rest of plan as per PGY 1 note.

## 2021-02-05 NOTE — HISTORY OF PRESENT ILLNESS
[FreeTextEntry1] : Follow-up for HTN and comorbidities [de-identified] : Patient is 71 y/o Nayely-en-Y gastric bypass, cholecystectomy, GERD, TIA, and HTN female who presents to for follow-up. Patient states she did not follow-up on referrals on prior visit because she was switching her insurance. \par \par Patient endorses pain in the center of her chest that began in November and states that she feels it everyday. She states that it also radiates to the upper back.It is not relieved with rest or with movement.  It is relieved with Aleve and sometimes it is 10/10 in severity. She describes it as a tightening pain. Denies any trauma to the area. \par \par In regards to her HTN, patient takes labetalol 200 BID and lisinopril-HCTZ 20-12.5 QD at home. BP checks today 130/100. Patient states that she  did not take her meds this AM. She states that she sometimes forgets to take them.  She has a blood pressure cuff, but does not check her BP often at home. Patient denies changes in vision or dizziness. Does endorse headaches about 2x monthly that are relieved with aleve. \par \par Patient also states that she is having lower back pain that radiates down the back of her left leg when she moves.  Patient also states that she is still having numbness of her left foot. Patient did not follow-up with physical therapy form prior visit. She does not endorse any falls or balance issues. \par \par She adds that she had childhood asthma. She states that she used a 2-3 puffs of her 's inhaler last month because she felt a little short of breath. \par \par Patient states her father  of colon cancer. She does not know the age at which he was diagnosed. \par Denies SOB, subjective f/c, n/v.

## 2021-02-05 NOTE — PHYSICAL EXAM
[No Acute Distress] : no acute distress [Well Nourished] : well nourished [Normal Sclera/Conjunctiva] : normal sclera/conjunctiva [PERRL] : pupils equal round and reactive to light [Supple] : supple [No Respiratory Distress] : no respiratory distress  [Clear to Auscultation] : lungs were clear to auscultation bilaterally [Normal Rate] : normal rate  [Regular Rhythm] : with a regular rhythm [No Edema] : there was no peripheral edema [Soft] : abdomen soft [Non Tender] : non-tender [No HSM] : no HSM [Normal Outer Ear/Nose] : the outer ears and nose were normal in appearance [No CVA Tenderness] : no CVA  tenderness [No Spinal Tenderness] : no spinal tenderness [de-identified] : Antalgic gait

## 2021-02-05 NOTE — HEALTH RISK ASSESSMENT
[No] : No [2 - 4 times a month (2 pts)] : 2-4 times a month (2 points) [0] : 2) Feeling down, depressed, or hopeless: Not at all (0) [] : No [de-identified] :  carbohydrates

## 2021-02-05 NOTE — REVIEW OF SYSTEMS
[Chest Pain] : chest pain [Fever] : no fever [Chills] : no chills [Fatigue] : no fatigue [Vision Problems] : no vision problems [Sore Throat] : no sore throat [Palpitations] : no palpitations [Lower Ext Edema] : no lower extremity edema [Shortness Of Breath] : no shortness of breath [Wheezing] : no wheezing [Cough] : no cough [Abdominal Pain] : no abdominal pain [Nausea] : no nausea [Vomiting] : no vomiting [Joint Pain] : no joint pain [Joint Stiffness] : no joint stiffness [Muscle Pain] : no muscle pain [Back Pain] : no back pain [Itching] : no itching [Headache] : no headache [Dizziness] : no dizziness [Fainting] : no fainting

## 2021-02-24 ENCOUNTER — APPOINTMENT (OUTPATIENT)
Dept: CV DIAGNOSTICS | Facility: HOSPITAL | Age: 71
End: 2021-02-24

## 2021-02-26 ENCOUNTER — NON-APPOINTMENT (OUTPATIENT)
Age: 71
End: 2021-02-26

## 2021-04-07 ENCOUNTER — APPOINTMENT (OUTPATIENT)
Dept: CV DIAGNOSTICS | Facility: HOSPITAL | Age: 71
End: 2021-04-07

## 2021-04-08 ENCOUNTER — APPOINTMENT (OUTPATIENT)
Dept: INTERNAL MEDICINE | Facility: CLINIC | Age: 71
End: 2021-04-08

## 2021-04-08 VITALS
BODY MASS INDEX: 35.17 KG/M2 | WEIGHT: 206 LBS | DIASTOLIC BLOOD PRESSURE: 84 MMHG | HEIGHT: 64 IN | SYSTOLIC BLOOD PRESSURE: 134 MMHG | OXYGEN SATURATION: 96 % | HEART RATE: 82 BPM | TEMPERATURE: 97.5 F | RESPIRATION RATE: 16 BRPM

## 2021-04-26 LAB — SARS-COV-2 N GENE NPH QL NAA+PROBE: NOT DETECTED

## 2021-04-28 ENCOUNTER — APPOINTMENT (OUTPATIENT)
Dept: INTERNAL MEDICINE | Facility: CLINIC | Age: 71
End: 2021-04-28
Payer: MEDICARE

## 2021-04-28 VITALS
DIASTOLIC BLOOD PRESSURE: 86 MMHG | RESPIRATION RATE: 20 BRPM | TEMPERATURE: 98.6 F | OXYGEN SATURATION: 96 % | HEART RATE: 72 BPM | SYSTOLIC BLOOD PRESSURE: 140 MMHG | WEIGHT: 206 LBS | HEIGHT: 63 IN | BODY MASS INDEX: 36.5 KG/M2

## 2021-04-28 PROCEDURE — 94010 BREATHING CAPACITY TEST: CPT

## 2021-04-28 PROCEDURE — 94727 GAS DIL/WSHOT DETER LNG VOL: CPT

## 2021-04-28 PROCEDURE — G0447 BEHAVIOR COUNSEL OBESITY 15M: CPT

## 2021-04-28 PROCEDURE — 99072 ADDL SUPL MATRL&STAF TM PHE: CPT

## 2021-04-28 PROCEDURE — ZZZZZ: CPT

## 2021-04-28 PROCEDURE — 99204 OFFICE O/P NEW MOD 45 MIN: CPT | Mod: 25

## 2021-04-28 PROCEDURE — 94729 DIFFUSING CAPACITY: CPT

## 2021-04-28 NOTE — ASSESSMENT
[FreeTextEntry1] : #1  Mild persistent asthma.  Patient will begin Flovent 110,  2 puffs twice daily, gargle after each use..  Albuterol inhaler was refilled for her, 1 to 2 puffs 4 times daily as needed.   \par \par #2 GERD.  GERD regimen.  Continue omeprazole.\par \par #3  Obesity.  BMI 36.49.  Pt was counseled on a healthy weight reduction diet and regular exercise.  See above.\par \par #4  Atypical chest discomforts.  I am referring for Kate to see cardiology.  The patient will call or return if she is having any increase in symptoms or clinical change.  She knows to go to the emergency room for any emergent symptoms.\par \par #5  History of Covid infection February 2021.\par \par Continue follow with primary care physician.  RV in 1 year, or at anytime prn.

## 2021-04-28 NOTE — HISTORY OF PRESENT ILLNESS
[TextBox_4] : This is the first pulmonary appointment for this 70-year-old female with a history of asthma for many years.  She feels that pollen can sometimes bring on her symptoms.  She has been using albuterol inhaler on an as-needed basis.  She does use it 2 times about 2 days ago.  Currently she is not having coughing, wheezing, dyspnea on exertion, or hemoptysis.  She tells me that in the past she has been on Flovent and Symbicort.  She has not had to go on oral steroids.  She has no history of hospitalization or ER visits for her asthma.  She is not having fever or chills.\par \par She did tell me she had Covid infection in February of this year. \par \par She does since typical chest and torso discomforts for at least 4 to 5 months.  This can be persistent and last all day.  No radiation to the neck or the left arm.  She has no known past history of angina.  I am recommending her to see cardiology.\par \par He has no history of cigarette smoking.  She has an occasional alcoholic drink.

## 2021-04-28 NOTE — COUNSELING
[Potential consequences of obesity discussed] : Potential consequences of obesity discussed [Decrease Portions] : decrease portions [____ min/wk Activity] : [unfilled] min/wk activity [FreeTextEntry4] : 15 [FreeTextEntry2] : healthy foods

## 2021-04-28 NOTE — PROCEDURE
[FreeTextEntry1] : Full pulmonary function testing today is within normal limits with an FEV1 of 1.66 or 90% predicted.  This improves 11% after inhaled bronchodilator.  Diffusing capacity is normal.  Oxygen saturation is 96% on room air.

## 2021-04-29 ENCOUNTER — RESULT REVIEW (OUTPATIENT)
Age: 71
End: 2021-04-29

## 2021-04-29 ENCOUNTER — OUTPATIENT (OUTPATIENT)
Dept: OUTPATIENT SERVICES | Facility: HOSPITAL | Age: 71
LOS: 1 days | End: 2021-04-29
Payer: COMMERCIAL

## 2021-04-29 ENCOUNTER — APPOINTMENT (OUTPATIENT)
Dept: RADIOLOGY | Facility: IMAGING CENTER | Age: 71
End: 2021-04-29
Payer: MEDICARE

## 2021-04-29 ENCOUNTER — APPOINTMENT (OUTPATIENT)
Dept: MAMMOGRAPHY | Facility: IMAGING CENTER | Age: 71
End: 2021-04-29
Payer: MEDICARE

## 2021-04-29 DIAGNOSIS — Z98.84 BARIATRIC SURGERY STATUS: Chronic | ICD-10-CM

## 2021-04-29 DIAGNOSIS — Z00.8 ENCOUNTER FOR OTHER GENERAL EXAMINATION: ICD-10-CM

## 2021-04-29 PROCEDURE — 77063 BREAST TOMOSYNTHESIS BI: CPT

## 2021-04-29 PROCEDURE — 77080 DXA BONE DENSITY AXIAL: CPT

## 2021-04-29 PROCEDURE — 77067 SCR MAMMO BI INCL CAD: CPT | Mod: 26

## 2021-04-29 PROCEDURE — 77063 BREAST TOMOSYNTHESIS BI: CPT | Mod: 26

## 2021-04-29 PROCEDURE — 77080 DXA BONE DENSITY AXIAL: CPT | Mod: 26

## 2021-04-29 PROCEDURE — 77067 SCR MAMMO BI INCL CAD: CPT

## 2021-05-20 ENCOUNTER — OUTPATIENT (OUTPATIENT)
Dept: OUTPATIENT SERVICES | Facility: HOSPITAL | Age: 71
LOS: 1 days | End: 2021-05-20

## 2021-05-20 ENCOUNTER — APPOINTMENT (OUTPATIENT)
Dept: INTERNAL MEDICINE | Facility: CLINIC | Age: 71
End: 2021-05-20
Payer: MEDICARE

## 2021-05-20 VITALS
OXYGEN SATURATION: 97 % | DIASTOLIC BLOOD PRESSURE: 92 MMHG | SYSTOLIC BLOOD PRESSURE: 142 MMHG | HEIGHT: 63 IN | HEART RATE: 75 BPM

## 2021-05-20 VITALS — TEMPERATURE: 98.3 F

## 2021-05-20 DIAGNOSIS — Z98.84 BARIATRIC SURGERY STATUS: Chronic | ICD-10-CM

## 2021-05-20 PROCEDURE — 99213 OFFICE O/P EST LOW 20 MIN: CPT | Mod: GE

## 2021-05-20 RX ORDER — CALCIUM CARBONATE 600 MG
600 TABLET ORAL
Qty: 30 | Refills: 5 | Status: ACTIVE | COMMUNITY
Start: 2021-05-20 | End: 1900-01-01

## 2021-05-20 RX ORDER — LABETALOL HYDROCHLORIDE 200 MG/1
200 TABLET, FILM COATED ORAL
Qty: 120 | Refills: 1 | Status: DISCONTINUED | COMMUNITY
Start: 1900-01-01 | End: 2021-05-20

## 2021-05-20 NOTE — HEALTH RISK ASSESSMENT
[0-5] : 0-5 [Yes] : Yes [Monthly or less (1 pt)] : Monthly or less (1 point) [0] : 2) Feeling down, depressed, or hopeless: Not at all (0) [] : No

## 2021-05-20 NOTE — HISTORY OF PRESENT ILLNESS
[FreeTextEntry1] : Follow-up for chronic conditions.  [de-identified] : Patient is 71 y/o Nayely-en-Y gastric bypass, cholecystectomy, GERD, TIA, mild persistent asthma,  and HTN female who presents to for follow-up. \par \par Today, endorses pain of bilateral knees that is exacerbated with ambulation. States that is it improvement with OTC tylenol and aleve. Denies radiation or trauma to the area. \par \par Pt established care with pulmonary on 4/28 for mild persistent asthma. Was started on flovent BID and albuterol PRN.\par \par Did not follow-up with cardiology. Pharmacologic stress test not done because of lack of insurance approval; peer to peer not approved.\par \par In regards to her HTN, patient takes labetalol 200 BID and lisinopril-HCTZ 20-12.5 QD at home. BP checks today 142/92. Patient states that she did not take her meds this AM. She states that she still sometimes forgets to take them. States that she tried to implement the reminder alarms that were discussed at prior appt, however she was noncompliant after a couple of days.  Patient denies changes in vision or dizziness. \par \par In regards to her lower back pain, pt did not go to physical therapy referral. She states that it is stable from prior and pain improved with tylenol . Continues to endorse radiation of pain down her legs.\par \par Denies CP, SOB, subjective f/c, n/v. \par

## 2021-05-20 NOTE — PHYSICAL EXAM
[No Acute Distress] : no acute distress [Well Nourished] : well nourished [Well Developed] : well developed [Normal Sclera/Conjunctiva] : normal sclera/conjunctiva [PERRL] : pupils equal round and reactive to light [Normal Outer Ear/Nose] : the outer ears and nose were normal in appearance [Normal Oropharynx] : the oropharynx was normal [Supple] : supple [No Respiratory Distress] : no respiratory distress  [No Accessory Muscle Use] : no accessory muscle use [Clear to Auscultation] : lungs were clear to auscultation bilaterally [Normal Rate] : normal rate  [Regular Rhythm] : with a regular rhythm [Pedal Pulses Present] : the pedal pulses are present [No Edema] : there was no peripheral edema [Soft] : abdomen soft [Non Tender] : non-tender [Non-distended] : non-distended [No CVA Tenderness] : no CVA  tenderness [No Spinal Tenderness] : no spinal tenderness [No Joint Swelling] : no joint swelling [Grossly Normal Strength/Tone] : grossly normal strength/tone [No Rash] : no rash [No Focal Deficits] : no focal deficits

## 2021-05-20 NOTE — REVIEW OF SYSTEMS
[Joint Pain] : joint pain [Back Pain] : back pain [Fever] : no fever [Chills] : no chills [Fatigue] : no fatigue [Discharge] : no discharge [Pain] : no pain [Vision Problems] : no vision problems [Hearing Loss] : no hearing loss [Nasal Discharge] : no nasal discharge [Sore Throat] : no sore throat [Chest Pain] : no chest pain [Palpitations] : no palpitations [Leg Claudication] : no leg claudication [Lower Ext Edema] : no lower extremity edema [Shortness Of Breath] : no shortness of breath [Wheezing] : no wheezing [Cough] : no cough [Abdominal Pain] : no abdominal pain [Nausea] : no nausea [Constipation] : no constipation [Diarrhea] : diarrhea [Vomiting] : no vomiting [Heartburn] : no heartburn [Dysuria] : no dysuria [Incontinence] : no incontinence [Nocturia] : no nocturia [Hematuria] : no hematuria [Frequency] : no frequency [Vaginal Discharge] : no vaginal discharge [Joint Stiffness] : no joint stiffness [Muscle Pain] : no muscle pain [Headache] : no headache [Dizziness] : no dizziness [Fainting] : no fainting

## 2021-05-21 DIAGNOSIS — M54.5 LOW BACK PAIN: ICD-10-CM

## 2021-05-21 DIAGNOSIS — M17.10 UNILATERAL PRIMARY OSTEOARTHRITIS, UNSPECIFIED KNEE: ICD-10-CM

## 2021-05-21 DIAGNOSIS — I10 ESSENTIAL (PRIMARY) HYPERTENSION: ICD-10-CM

## 2021-05-21 DIAGNOSIS — K21.9 GASTRO-ESOPHAGEAL REFLUX DISEASE WITHOUT ESOPHAGITIS: ICD-10-CM

## 2021-05-21 DIAGNOSIS — R07.89 OTHER CHEST PAIN: ICD-10-CM

## 2021-05-21 DIAGNOSIS — J45.30 MILD PERSISTENT ASTHMA, UNCOMPLICATED: ICD-10-CM

## 2021-06-08 ENCOUNTER — NON-APPOINTMENT (OUTPATIENT)
Age: 71
End: 2021-06-08

## 2021-06-15 DIAGNOSIS — Z78.9 OTHER SPECIFIED HEALTH STATUS: ICD-10-CM

## 2021-06-15 DIAGNOSIS — Z87.19 PERSONAL HISTORY OF OTHER DISEASES OF THE DIGESTIVE SYSTEM: ICD-10-CM

## 2021-06-24 ENCOUNTER — APPOINTMENT (OUTPATIENT)
Dept: CARDIOLOGY | Facility: CLINIC | Age: 71
End: 2021-06-24

## 2021-06-28 ENCOUNTER — RX RENEWAL (OUTPATIENT)
Age: 71
End: 2021-06-28

## 2021-07-12 ENCOUNTER — APPOINTMENT (OUTPATIENT)
Dept: CARDIOLOGY | Facility: CLINIC | Age: 71
End: 2021-07-12

## 2021-08-26 ENCOUNTER — APPOINTMENT (OUTPATIENT)
Dept: GASTROENTEROLOGY | Facility: CLINIC | Age: 71
End: 2021-08-26

## 2021-08-30 ENCOUNTER — APPOINTMENT (OUTPATIENT)
Dept: INTERNAL MEDICINE | Facility: CLINIC | Age: 71
End: 2021-08-30

## 2021-11-15 ENCOUNTER — RX RENEWAL (OUTPATIENT)
Age: 71
End: 2021-11-15

## 2022-01-19 ENCOUNTER — APPOINTMENT (OUTPATIENT)
Dept: ORTHOPEDIC SURGERY | Facility: CLINIC | Age: 72
End: 2022-01-19
Payer: MEDICARE

## 2022-01-19 VITALS
HEIGHT: 64 IN | BODY MASS INDEX: 35.51 KG/M2 | SYSTOLIC BLOOD PRESSURE: 181 MMHG | DIASTOLIC BLOOD PRESSURE: 101 MMHG | WEIGHT: 208 LBS | HEART RATE: 84 BPM

## 2022-01-19 DIAGNOSIS — M25.532 PAIN IN LEFT WRIST: ICD-10-CM

## 2022-01-19 PROCEDURE — 99204 OFFICE O/P NEW MOD 45 MIN: CPT | Mod: 25

## 2022-01-19 PROCEDURE — 20550 NJX 1 TENDON SHEATH/LIGAMENT: CPT | Mod: LT

## 2022-01-19 NOTE — HISTORY OF PRESENT ILLNESS
[FreeTextEntry1] : 01/19/2022: Patient is a 71 year-old, ambidextrous female who presents to the office today for initial evaluation of left wrist/thumb pain. The pain has been present since November. She does not remember any injury or inciting event at that time. The pain is over the first dorsal compartment of the wrist, but also at the base of the thumb. It is associated with hypersensitivity and swelling. It wakes her up at night and also interferes with her daily activities. She describes the pain as throbbing and sharp with activity. It sometimes radiates up the radial aspect of the forearm.She was seen at a hospital in [place] because of the pain. She was told she did not have any fractures, but she had sepsis at the time. She was given IV antibiotics at the time and her infection resolved. This occurred at the end of November. She has taken Ibuprofen which did give her some temporary relief. She has tried IcyHot cream and Vicks Vaporub which did not give her any relief. She presents today for further treatment options.

## 2022-01-19 NOTE — PHYSICAL EXAM
[Normal Finger/nose] : finger to nose coordination [Normal] : no peripheral adenopathy appreciated [de-identified] : Right Wrist/Hand Exam\par \par Skin is intact with no erythema or ecchymosis. There is swelling overlying the first dorsal compartment. There are no gross deformities. Tenderness over the first dorsal compartment is noted with palpation. There is a negative Grind test. There is a positive Finkelstein's test. Range of motion of the wrist shows 60 degrees of flexion and 60 degrees of extension. Patient able to fully pronate and supinate. Range of motion of all digits is fully intact and patient is able to make a composite fist. No ulnar fovea, SL interval, distal radius or distal ulna tenderness. There is no snuffbox tenderness. There is a negative Warner's test. Sensation is grossly intact and distal pulses are 2+. [de-identified] : SEVEROR [de-identified] : 3 xray views of the right wrist were obtained. No fractures or dislocations are noted. There is a slight negative ulnar variance. Evidence of distal radiocarpal arthritis is noted. There is mild evidence of basal joint arthritis with a small osteophyte noted at the inferior joint surface of the trapezium.

## 2022-01-19 NOTE — ASSESSMENT
[FreeTextEntry1] : Patient with left wrist pain consistent with de Quervain's tenosynovitis. The nature of this condition was described at length with the patient and she verbalized understanding. Conservative treatment with bracing and anti-inflammatories was recommended. She was told to purchase a comfort-cool thumb immobilizer that she will wear with activities only. A prescription for Meloxicam was sent. Today, a cortisone injection was administered to the first dorsal compartment under sterile conditions and the patient tolerated the procedure well. She will follow up in 4-6 weeks with myself or one of our hand surgeons for a possible second injection, should her symptoms return. The patient is in agreement with this plan and very appreciative of her care.

## 2022-01-19 NOTE — PROCEDURE
[FreeTextEntry1] : Injection: Wrist Left\par \par There was a discussion with the patient regarding this procedure and all questions/concerns were answered.  All of the risks, benefits and alternatives were discussed at length.  These include but are not limited to bleeding, infection, and allergic reaction.  Alcohol was used to clean, sterilize and prep the skin at the injection site on the first dorsal aspect of the wrist.  Ethyl chloride spray was used as a topical anesthetic.  A 25G needle was used to inject 1cc of 1% lidocaine and 1cc of 4mg/mL dexamethasone into the wrist.  A sterile bandage was applied to the site of the injection.  The patient tolerated the procedure well and there were no complications.\par

## 2022-02-23 ENCOUNTER — APPOINTMENT (OUTPATIENT)
Dept: INTERNAL MEDICINE | Facility: CLINIC | Age: 72
End: 2022-02-23
Payer: MEDICARE

## 2022-02-23 ENCOUNTER — OUTPATIENT (OUTPATIENT)
Dept: OUTPATIENT SERVICES | Facility: HOSPITAL | Age: 72
LOS: 1 days | End: 2022-02-23

## 2022-02-23 ENCOUNTER — MED ADMIN CHARGE (OUTPATIENT)
Age: 72
End: 2022-02-23

## 2022-02-23 ENCOUNTER — NON-APPOINTMENT (OUTPATIENT)
Age: 72
End: 2022-02-23

## 2022-02-23 VITALS
DIASTOLIC BLOOD PRESSURE: 100 MMHG | SYSTOLIC BLOOD PRESSURE: 160 MMHG | TEMPERATURE: 96.6 F | HEART RATE: 75 BPM | OXYGEN SATURATION: 99 % | BODY MASS INDEX: 38.95 KG/M2 | WEIGHT: 198.38 LBS | HEIGHT: 60 IN

## 2022-02-23 DIAGNOSIS — Z98.84 BARIATRIC SURGERY STATUS: Chronic | ICD-10-CM

## 2022-02-23 PROCEDURE — 99214 OFFICE O/P EST MOD 30 MIN: CPT | Mod: GC

## 2022-02-23 NOTE — PHYSICAL EXAM
[No Acute Distress] : no acute distress [Well Nourished] : well nourished [Well Developed] : well developed [Normal Sclera/Conjunctiva] : normal sclera/conjunctiva [PERRL] : pupils equal round and reactive to light [Normal Outer Ear/Nose] : the outer ears and nose were normal in appearance [Normal Oropharynx] : the oropharynx was normal [No JVD] : no jugular venous distention [No Lymphadenopathy] : no lymphadenopathy [Supple] : supple [No Respiratory Distress] : no respiratory distress  [No Accessory Muscle Use] : no accessory muscle use [Clear to Auscultation] : lungs were clear to auscultation bilaterally [Normal Rate] : normal rate  [Regular Rhythm] : with a regular rhythm [Normal S1, S2] : normal S1 and S2 [No Varicosities] : no varicosities [No Edema] : there was no peripheral edema [Soft] : abdomen soft [Non Tender] : non-tender [No Masses] : no abdominal mass palpated [Normal Bowel Sounds] : normal bowel sounds [Normal Posterior Cervical Nodes] : no posterior cervical lymphadenopathy [Normal Anterior Cervical Nodes] : no anterior cervical lymphadenopathy [No CVA Tenderness] : no CVA  tenderness [No Spinal Tenderness] : no spinal tenderness [No Joint Swelling] : no joint swelling [Grossly Normal Strength/Tone] : grossly normal strength/tone [No Rash] : no rash [Coordination Grossly Intact] : coordination grossly intact [No Focal Deficits] : no focal deficits [Normal Gait] : normal gait [Normal Affect] : the affect was normal

## 2022-02-25 DIAGNOSIS — K21.9 GASTRO-ESOPHAGEAL REFLUX DISEASE WITHOUT ESOPHAGITIS: ICD-10-CM

## 2022-02-25 DIAGNOSIS — J45.30 MILD PERSISTENT ASTHMA, UNCOMPLICATED: ICD-10-CM

## 2022-02-25 DIAGNOSIS — I10 ESSENTIAL (PRIMARY) HYPERTENSION: ICD-10-CM

## 2022-02-25 DIAGNOSIS — M17.10 UNILATERAL PRIMARY OSTEOARTHRITIS, UNSPECIFIED KNEE: ICD-10-CM

## 2022-02-25 DIAGNOSIS — M65.4 RADIAL STYLOID TENOSYNOVITIS [DE QUERVAIN]: ICD-10-CM

## 2022-02-25 NOTE — ASSESSMENT
[FreeTextEntry1] : RTC: in 5 weeks for in-office visit for BP management\par \par Case discussed with Dr. Morgan\par \par -Grace Barnes, PGY-2

## 2022-02-25 NOTE — HISTORY OF PRESENT ILLNESS
[FreeTextEntry1] : Follow-up on chronic conditions [de-identified] : Patient is a 71 y/oF with hx of  Nayely-en-Y gastric bypass, cholecystectomy, GERD, TIA, mild persistent asthma, and HTN female who presents today for follow-up. \par \par Since, last visit patient had a hospitalization in [place] for inflammation of the left hand, where she received IV antibiotics in late November 2021. Patient states that she does not know which antibiotics she received but states that she received only one dose before being discharged. She followed with ortho in 1/2022, where she was diagnosed with tenosynovitis of the left hand was given a cortisone injection. \par \par In regards to her HTN, patient prescribed lisinopril-HCTZ 20-12.5 QD and Toprol 50 mg QD at home. On last visit,patient was non-compliant with labetalol due to BID dosing and was switched to Toprol 50mg QD. BP checks today 160/100 in office. Patient states that she has not take her meds this AM and has also missed them the last couple of days.  She states that she still sometimes forgets to take them. States that she tried to implement the reminder alarms that were discussed at prior appt, however has not done so. States that she went to [place] in November to take care of her mother with Alzheimers and that this has been a barrier to her taking hcare of herself. Also, adds that she is going back to [place] in April of this year.  Patient denies changes in vision or dizziness.\par \par In regards to her GERD, she takes omeprazole, but is endorsing burning sensation despite taking it. She wakes up at night sometimes with the burning sensation. Pain is non-radiatinng. Denies trouble swallowing etc. Has not seen GI in the past or gotten an endoscopy. \par \par In regards to her asthma, she has been taking flovent BID and albuterol PRN.\par \par Patient states that her back pain and knee OA from last visit is resolved and that she takes PRN tylenol. \par \par Denies CP, SOB, subjective f/c, n/v.

## 2022-02-25 NOTE — REVIEW OF SYSTEMS
[Fever] : no fever [Chills] : no chills [Fatigue] : no fatigue [Discharge] : no discharge [Pain] : no pain [Vision Problems] : no vision problems [Earache] : no earache [Hearing Loss] : no hearing loss [Nasal Discharge] : no nasal discharge [Sore Throat] : no sore throat [Chest Pain] : no chest pain [Palpitations] : no palpitations [Leg Claudication] : no leg claudication [Lower Ext Edema] : no lower extremity edema [Orthopnea] : no orthopnea [Shortness Of Breath] : no shortness of breath [Wheezing] : no wheezing [Cough] : no cough [Abdominal Pain] : no abdominal pain [Nausea] : no nausea [Constipation] : no constipation [Vomiting] : no vomiting [Melena] : no melena [Dysuria] : no dysuria [Incontinence] : no incontinence [Nocturia] : no nocturia [Hematuria] : no hematuria [Frequency] : no frequency [Joint Pain] : no joint pain [Joint Stiffness] : no joint stiffness [Muscle Pain] : no muscle pain [Back Pain] : no back pain [Itching] : no itching [Skin Rash] : no skin rash [Headache] : no headache [Dizziness] : no dizziness [Anxiety] : no anxiety

## 2022-03-02 ENCOUNTER — APPOINTMENT (OUTPATIENT)
Dept: GASTROENTEROLOGY | Facility: CLINIC | Age: 72
End: 2022-03-02
Payer: MEDICARE

## 2022-03-02 VITALS
WEIGHT: 198 LBS | DIASTOLIC BLOOD PRESSURE: 98 MMHG | HEART RATE: 65 BPM | SYSTOLIC BLOOD PRESSURE: 190 MMHG | HEIGHT: 63 IN | BODY MASS INDEX: 35.08 KG/M2

## 2022-03-02 DIAGNOSIS — Z80.0 FAMILY HISTORY OF MALIGNANT NEOPLASM OF DIGESTIVE ORGANS: ICD-10-CM

## 2022-03-02 PROCEDURE — 99204 OFFICE O/P NEW MOD 45 MIN: CPT

## 2022-03-02 NOTE — REASON FOR VISIT
[Consultation] : a consultation visit [FreeTextEntry1] : severe acid reflux, surveillance colonoscopy

## 2022-03-02 NOTE — REVIEW OF SYSTEMS
[Heartburn] : heartburn [Negative] : Heme/Lymph [As Noted in HPI] : as noted in HPI [FreeTextEntry9] : Joint pain

## 2022-03-02 NOTE — HISTORY OF PRESENT ILLNESS
[Heartburn] : heartburn worsened [Nausea] : denies nausea [Vomiting] : denies vomiting [Diarrhea] : denies diarrhea [Constipation] : stable constipation [Yellow Skin Or Eyes (Jaundice)] : denies jaundice [Abdominal Pain] : stable abdominal pain [Abdominal Swelling] : denies abdominal swelling [Rectal Pain] : denies rectal pain [de-identified] : Kate presents to the office today for evaluation of worsening GERD.  She is also due for a surveillance colonoscopy.\par \par The patient had a prior Nayely-en-Y gastric bypass more than 15 years ago for morbid obesity and initially lost more than 100 lbs.  She has been slowly regaining weight.\par \par She has had chronic reflux and burning pain for more than 10 years and has been taking omeprazole for a long time.  The omeprazole helps when she takes it.  In the beginning, symptoms would improve quickly after drinking water but for several months, the burning discomfort has been more intense and lasts for a longer period of time.  The symptoms are worse if the stomach is empty or if she eats certain things (coffee, spicy foods, oranges).  She is currently taking meloxicam.  She has to eat small amounts slowly but denies dysphagia or dysphagia.  She tends to have constipation and moves her bowels at least 3 times a week.  Sometimes, drinking coffee helps her move her bowels.  She denies any rectal bleeding.  Her father had colon cancer.  She has had prior EGDs and colonoscopies with Dr. Tolbert, more than 4 years ago, and had polyps removed in the past.

## 2022-03-02 NOTE — ASSESSMENT
[FreeTextEntry1] : 1.  Worsening GERD on chronic PPI.  May be due to NSAIDs, anastomotic ulcer, diet, bile acid reflux.  Rule out Parnell's.\par 2.  Encounter for surveillance colonoscopy.  History of colon polyps on prior colonoscopy (~5 years ago).  Family history of colon cancer (father).\par 3.  History of morbid obesity status post Nayely-en-Y gastric bypass (15 years ago).\par 4.  HTN.\par 5.  Asthma.\par 6.  Status post cholecystectomy.\par \par Recs:\par - Prior labs reviewed.\par - Patient counseled on low sodium diet, compliance with antihypertensives, risks of uncontrolled HTN.\par - Patient was counseled on GERD lifestyle modifications including head of bed elevation at night, avoiding lying down 2-3 hours after eating, weight loss, avoiding tight-fitting clothing, eating small, frequent meals, and minimizing potential food triggers (tomatoes/sauce, caffeine, alcohol, spicy foods, citrus foods, red meat, chocolate, mint, carbonated beverages).\par - Continue PPI.\par - Patient was advised to undergo endoscopy and colonoscopy- procedure, risks, benefits, and alternatives were explained. Patient agreeable. Brochure given. Golytely prep.

## 2022-03-18 ENCOUNTER — APPOINTMENT (OUTPATIENT)
Dept: GASTROENTEROLOGY | Facility: CLINIC | Age: 72
End: 2022-03-18

## 2022-03-30 ENCOUNTER — APPOINTMENT (OUTPATIENT)
Dept: INTERNAL MEDICINE | Facility: CLINIC | Age: 72
End: 2022-03-30

## 2022-03-31 ENCOUNTER — APPOINTMENT (OUTPATIENT)
Dept: ORTHOPEDIC SURGERY | Facility: CLINIC | Age: 72
End: 2022-03-31
Payer: MEDICARE

## 2022-03-31 VITALS — BODY MASS INDEX: 35.51 KG/M2 | WEIGHT: 208 LBS | HEIGHT: 64 IN

## 2022-03-31 PROCEDURE — 99214 OFFICE O/P EST MOD 30 MIN: CPT | Mod: 25

## 2022-03-31 PROCEDURE — 73564 X-RAY EXAM KNEE 4 OR MORE: CPT | Mod: RT

## 2022-03-31 PROCEDURE — 20550 NJX 1 TENDON SHEATH/LIGAMENT: CPT | Mod: LT

## 2022-03-31 NOTE — PHYSICAL EXAM
[de-identified] : Patient is WDWN, alert, and in no acute distress. Breathing is unlabored. She is grossly oriented to person, place, and time.\par \par Left Wrist:\par There is tenderness over the first dorsal compartment at the level of the radial styloid. Swelling is localized to the area. There are no visible deformities. The ROM is full in all planes with no crepitus. There is no joint instability on provocative testing. Sensation is normal.\par Range of Motion: Pain is elicited with resisted radial deviation of the wrist. \par Tests/Signs: Finkelstein's test is positive.	\par Hyperpigmentation in the area of the radial styloid to the left wrist\par \par Right Knee:\par Medial joint line tenderness without lateral joint line tenderness present\par ROM: 0-120° 	  [de-identified] : AP, lateral and oblique views of the LEFT wrist were obtained today and revealed no abnormalities. No acute fracture. No dislocation. Cartilage spaces are maintained. \par \par AP, lateral, skyline, and tunnel views of the RIGHT knee were obtained and revealed medial compartment arthritis.

## 2022-03-31 NOTE — ADDENDUM
[FreeTextEntry1] : I, Salome Araiza wrote this note acting as a scribe for Dr. Cal Hartley on Mar 31, 2022.

## 2022-03-31 NOTE — HISTORY OF PRESENT ILLNESS
[Right] : right hand dominant [FreeTextEntry1] : Pt is a 70 y/o female with left radial wrist pain which began last year.  She was seen by DILMA Neil in January and she was given a cortisone injection for DeQuervain's.  It helped but did not last long and her symptoms recurred by the end of the month. She was additionally provided with a brace which she does wear.  She cares for her mother in [place] and she is returning there.  She would like to have another injection for some pain relief before she goes.\par She does additionally complain of right knee pain. She reports her symptoms to be exacerbated by prolonged periods of time in a sustained position, such as when she is driving. \par \par

## 2022-03-31 NOTE — DISCUSSION/SUMMARY
[FreeTextEntry1] : The underlying pathophysiology was reviewed with the patient. XR films were reviewed with the patient. Discussed at length the nature of the patient’s condition. The left wrist symptoms appear secondary to De Quervain's tendinitis.\par \par At this time, treatment recommendations were discussed such as a repeat cortisone injection to the first extensor compartment of the left wrist, as well as use of oral and topical antiinflammatories. I am additionally recommending use of a thumb spica splint. \par The patient has agreed to proceed with a cortisone injection. The skin was prepped with alcohol and sprayed with Ethyl Chloride. An injection of 0.5 cc 1% Lidocaine without epinephrine, 0.25 cc Kenalog 40 mg, and 0.25 cc Dexamethasone was administered into the LEFT first extensor compartment (2/3) The patient tolerated the procedure well. Rest and apply ice. \par \par With regard to the right knee, she was instructed on activity modification. I did also recommend use of topical modalities such as Voltaren gel as well as oral antiinflammatories. If her symptoms do not improve or progress, we did also briefly discuss the use of gel or cortisone injections. \par \par All questions answered, understanding verbalized. Patient in agreement with plan of care. Follow up as needed, according to her symptoms.

## 2022-03-31 NOTE — END OF VISIT
[FreeTextEntry3] : All medical record entries made by the Scribe were at my,  Dr. Cal Hartley MD., direction and personally dictated by me on 03/31/2022. I have personally reviewed the chart and agree that the record accurately reflects my personal performance of the history, physical exam, assessment and plan.

## 2022-04-06 ENCOUNTER — LABORATORY RESULT (OUTPATIENT)
Age: 72
End: 2022-04-06

## 2022-04-06 ENCOUNTER — APPOINTMENT (OUTPATIENT)
Dept: GASTROENTEROLOGY | Facility: CLINIC | Age: 72
End: 2022-04-06
Payer: MEDICARE

## 2022-04-06 PROCEDURE — 45378 DIAGNOSTIC COLONOSCOPY: CPT

## 2022-04-06 PROCEDURE — 43239 EGD BIOPSY SINGLE/MULTIPLE: CPT | Mod: 59

## 2022-04-08 ENCOUNTER — NON-APPOINTMENT (OUTPATIENT)
Age: 72
End: 2022-04-08

## 2022-04-11 ENCOUNTER — TRANSCRIPTION ENCOUNTER (OUTPATIENT)
Age: 72
End: 2022-04-11

## 2022-04-11 ENCOUNTER — NON-APPOINTMENT (OUTPATIENT)
Age: 72
End: 2022-04-11

## 2022-04-28 ENCOUNTER — RX RENEWAL (OUTPATIENT)
Age: 72
End: 2022-04-28

## 2022-11-10 ENCOUNTER — APPOINTMENT (OUTPATIENT)
Dept: ORTHOPEDIC SURGERY | Facility: CLINIC | Age: 72
End: 2022-11-10

## 2022-11-10 VITALS — WEIGHT: 208 LBS | BODY MASS INDEX: 35.51 KG/M2 | HEIGHT: 64 IN

## 2022-11-10 DIAGNOSIS — M25.519 PAIN IN UNSPECIFIED SHOULDER: ICD-10-CM

## 2022-11-10 PROCEDURE — 73030 X-RAY EXAM OF SHOULDER: CPT | Mod: LT

## 2022-11-10 PROCEDURE — 20610 DRAIN/INJ JOINT/BURSA W/O US: CPT

## 2022-11-10 PROCEDURE — 99214 OFFICE O/P EST MOD 30 MIN: CPT | Mod: 25

## 2022-11-10 NOTE — END OF VISIT
[FreeTextEntry3] : All medical record entries made by the Scribe were at my,  Dr. Cal Hartley MD., direction and personally dictated by me on 11/10/2022. I have personally reviewed the chart and agree that the record accurately reflects my personal performance of the history, physical exam, assessment and plan.

## 2022-11-10 NOTE — DISCUSSION/SUMMARY
[de-identified] : The underlying pathophysiology was reviewed with the patient. XR films were reviewed with the patient. Discussed at length the nature of the patient’s condition. The left shoulder symptoms appear secondary to rotator cuff tendinitis/bursitis. \par \par At this time, treatment options were discussed consisting of oral and topical antiinflammatories versus a cortisone injection.\par The patient wishes to proceed with a cortisone injection today. Under sterile precautions, an injection of 5cc 1% lidocaine without epinephrine and 0.5cc Kenalog 40mg, 0.5cc Dexamethasone was administered into the LEFT posterior subacromial joint space. The patient tolerated the procedure well. Rest and apply ice.\par \par All questions answered, understanding verbalized. Patient in agreement with plan of care. Follow up in 4 to 6 weeks for reassessment, if needed.

## 2022-11-10 NOTE — PHYSICAL EXAM
[de-identified] : Patient is WDWN, alert, and in no acute distress. Breathing is unlabored. She is grossly oriented to person, place, and time.\par \par Left Upper Extremity:\par She has limited flexion to 90° with pain and pulling felt through the region of the left flank.\par No deformities, ecchymosis or edema.\par Joint stability is intact.  [de-identified] : AP, transcapula, and axillary views of the LEFT shoulder were obtained and revealed no abnormalities. No acute fracture. No dislocation. Cartilage spaces are maintained.

## 2022-11-10 NOTE — ADDENDUM
[FreeTextEntry1] : I, Salome Araiza wrote this note acting as a scribe for Dr. Cal Hartley on Nov 10, 2022.

## 2022-11-10 NOTE — HISTORY OF PRESENT ILLNESS
[de-identified] : Pt presents with c/o left shoulder pain for the past 4 months. Pt denies recent injury/falls. Pain radiates to elbow and left side of her chest,. Pt states she has decreased ROM due to pain, Pt denies numbness,tingling, on B/L LE. pt takes Advil 400mg this provides no pain relief, Pt denies having cortisone injections on her left shoulder in the past, \par Pt does not participate with PT at this time.  Moving LUE exacerbates her pain\par \par She was in the past treated for left de Quervain's tendinitis with 2 cortisone injections in the past. \par \par

## 2022-11-21 ENCOUNTER — APPOINTMENT (OUTPATIENT)
Dept: ORTHOPEDIC SURGERY | Facility: CLINIC | Age: 72
End: 2022-11-21

## 2022-11-21 PROCEDURE — 99213 OFFICE O/P EST LOW 20 MIN: CPT

## 2022-11-21 NOTE — END OF VISIT
[FreeTextEntry3] : All medical record entries made by the Scribe were at my,  Dr. Cal Hartley MD., direction and personally dictated by me on 11/21/2022. I have personally reviewed the chart and agree that the record accurately reflects my personal performance of the history, physical exam, assessment and plan.

## 2022-11-21 NOTE — ADDENDUM
[FreeTextEntry1] : I, Salome Araiza wrote this note acting as a scribe for Dr. Cal Hartley on Nov 21, 2022.

## 2022-11-21 NOTE — PHYSICAL EXAM
[de-identified] : Patient is WDWN, alert, and in no acute distress. Breathing is unlabored. She is grossly oriented to person, place, and time.\par \par Left Upper Extremity:\par She has limited flexion to 90° with pain and pulling felt through the region of the left flank.\par No deformities, ecchymosis or edema.\par Joint stability is intact.  [de-identified] : AP, transcapula, and axillary views of the LEFT shoulder were obtained and revealed no abnormalities. No acute fracture. No dislocation. Cartilage spaces are maintained.

## 2022-11-21 NOTE — HISTORY OF PRESENT ILLNESS
[de-identified] : Pt presents with c/o left shoulder pain for the past 4 months. Pt denies recent injury/falls. Pain radiates to elbow and left side of her chest,. Pt states she has decreased ROM due to pain, Pt denies numbness,tingling, on B/L LE. pt takes Advil 400mg this provides no pain relief, Pt denies having cortisone injections on her left shoulder in the past, \par Pt does not participate with PT at this time.  Moving LUE exacerbates her pain. She was treated in the office 11 days ago on 11/10/22 with a cortisone injection to the left shoulder. She returns on 11/21/22 and reports no improvement in her symptoms. She states the pain has continued and she is concerned as it radiates through the left pectoralis region as well as to her left breast. \par \par She was in the past treated for left de Quervain's tendinitis with 2 cortisone injections in the past. \par \par

## 2022-11-21 NOTE — DISCUSSION/SUMMARY
[de-identified] : The underlying pathophysiology was reviewed with the patient. XR films were reviewed with the patient. Discussed at length the nature of the patient’s condition. The left shoulder symptoms appear secondary to rotator cuff tendinitis/bursitis. \par \par At this time, given her lack of response to a cortisone injection 11 days ago and failure of conservative treatment with oral and topical antiinflammatories, I recommended an MRI for further evaluation.\par An MRI of the LEFT shoulder was ordered to evaluate for rotator cuff tear. Patient will follow-up to review the results and discuss further treatment.	 \par \par All questions answered, understanding verbalized. Patient in agreement with plan of care. Follow up after MRI results.

## 2022-11-28 ENCOUNTER — APPOINTMENT (OUTPATIENT)
Dept: INTERNAL MEDICINE | Facility: CLINIC | Age: 72
End: 2022-11-28

## 2022-11-28 ENCOUNTER — OUTPATIENT (OUTPATIENT)
Dept: OUTPATIENT SERVICES | Facility: HOSPITAL | Age: 72
LOS: 1 days | End: 2022-11-28

## 2022-11-28 VITALS
WEIGHT: 194 LBS | BODY MASS INDEX: 35.7 KG/M2 | SYSTOLIC BLOOD PRESSURE: 130 MMHG | HEIGHT: 62 IN | DIASTOLIC BLOOD PRESSURE: 80 MMHG

## 2022-11-28 DIAGNOSIS — Z98.84 BARIATRIC SURGERY STATUS: Chronic | ICD-10-CM

## 2022-11-28 DIAGNOSIS — M75.82 OTHER SHOULDER LESIONS, LEFT SHOULDER: ICD-10-CM

## 2022-11-28 PROCEDURE — 99214 OFFICE O/P EST MOD 30 MIN: CPT | Mod: GC

## 2022-12-05 ENCOUNTER — OUTPATIENT (OUTPATIENT)
Dept: OUTPATIENT SERVICES | Facility: HOSPITAL | Age: 72
LOS: 1 days | End: 2022-12-05
Payer: COMMERCIAL

## 2022-12-05 ENCOUNTER — APPOINTMENT (OUTPATIENT)
Dept: MRI IMAGING | Facility: CLINIC | Age: 72
End: 2022-12-05
Payer: MEDICARE

## 2022-12-05 DIAGNOSIS — M75.82 OTHER SHOULDER LESIONS, LEFT SHOULDER: ICD-10-CM

## 2022-12-05 DIAGNOSIS — Z98.84 BARIATRIC SURGERY STATUS: Chronic | ICD-10-CM

## 2022-12-05 DIAGNOSIS — Z00.8 ENCOUNTER FOR OTHER GENERAL EXAMINATION: ICD-10-CM

## 2022-12-05 PROCEDURE — 73221 MRI JOINT UPR EXTREM W/O DYE: CPT | Mod: 26,LT

## 2022-12-05 PROCEDURE — 73221 MRI JOINT UPR EXTREM W/O DYE: CPT

## 2022-12-06 DIAGNOSIS — Z00.00 ENCOUNTER FOR GENERAL ADULT MEDICAL EXAMINATION WITHOUT ABNORMAL FINDINGS: ICD-10-CM

## 2022-12-06 DIAGNOSIS — M75.82 OTHER SHOULDER LESIONS, LEFT SHOULDER: ICD-10-CM

## 2022-12-06 DIAGNOSIS — Z23 ENCOUNTER FOR IMMUNIZATION: ICD-10-CM

## 2022-12-06 DIAGNOSIS — I10 ESSENTIAL (PRIMARY) HYPERTENSION: ICD-10-CM

## 2022-12-06 NOTE — REVIEW OF SYSTEMS
[Headache] : headache [Dizziness] : dizziness [Unsteady Walking] : ataxia [Fever] : no fever [Chills] : no chills [Fatigue] : no fatigue [Sore Throat] : no sore throat [Chest Pain] : no chest pain [Palpitations] : no palpitations [Leg Claudication] : no leg claudication [Shortness Of Breath] : no shortness of breath [Wheezing] : no wheezing [Cough] : no cough [Abdominal Pain] : no abdominal pain [Nausea] : no nausea [Constipation] : no constipation [Diarrhea] : diarrhea [Dysuria] : no dysuria [Hematuria] : no hematuria

## 2022-12-06 NOTE — HISTORY OF PRESENT ILLNESS
[FreeTextEntry1] : Follow-up on chronic conditions. \par \par  [de-identified] : Patient is a 71 y/oF with hx of Nayely-en-Y gastric bypass, cholecystectomy, GERD, TIA, mild persistent asthma, and HTN female who presents today for follow-up on her chronic conditions and acute complains of left shoulder pain\par \par #shoulder pain\par it started about a month ago, came in waves, lasts about 5-10mins, moderate, which radiates down from the medial aspect of left arm to the elbow. she went to urgent care for the pain, X ray showed no abnormalities. No acute fracture. No dislocation. Cartilage spaces are maintained. Urgent care referred her to the ortho, who evaluated her and recommended further work up of MRI. she also got a cortisol shot about 2 weeks ago at ortho office. \par \par #HTN\par She has been taking meloxicam for the past 3-4 weeks instead of metoprolol. she mistook the meds since both starts with M. otherwise, she is compliant with taking other meds, her bp today is well controlled with a systolic of 130s. When she took it last week, it was 160s.  \par Of note, she is leaving for [place] to take care of her mother on 12/06.  she wants a refill of meds that last at least 3 months. she endorsed some dizziness, and imbalance, but denied any worsening of her headache. \par \par \par \par

## 2022-12-06 NOTE — PHYSICAL EXAM
[No Acute Distress] : no acute distress [Well Nourished] : well nourished [Well Developed] : well developed [No Respiratory Distress] : no respiratory distress  [No Accessory Muscle Use] : no accessory muscle use [Clear to Auscultation] : lungs were clear to auscultation bilaterally [Normal Rate] : normal rate  [Regular Rhythm] : with a regular rhythm [Normal S1, S2] : normal S1 and S2 [No Murmur] : no murmur heard [Pedal Pulses Present] : the pedal pulses are present [No Edema] : there was no peripheral edema [No Extremity Clubbing/Cyanosis] : no extremity clubbing/cyanosis [Soft] : abdomen soft [Non Tender] : non-tender [Non-distended] : non-distended [No Joint Swelling] : no joint swelling [de-identified] : Left shoulder has reduced ROM on flexion, abduction, empty can , neer and hawkings neg, bicep tendonitis test also neg.

## 2022-12-06 NOTE — END OF VISIT
[] : Resident [FreeTextEntry3] : Pt getting back onto proper antihypertensive regimen and will recheck at home, then in office next.

## 2023-02-27 ENCOUNTER — RX RENEWAL (OUTPATIENT)
Age: 73
End: 2023-02-27

## 2023-03-07 ENCOUNTER — NON-APPOINTMENT (OUTPATIENT)
Age: 73
End: 2023-03-07

## 2023-03-13 ENCOUNTER — OUTPATIENT (OUTPATIENT)
Dept: OUTPATIENT SERVICES | Facility: HOSPITAL | Age: 73
LOS: 1 days | End: 2023-03-13

## 2023-03-13 ENCOUNTER — APPOINTMENT (OUTPATIENT)
Dept: INTERNAL MEDICINE | Facility: CLINIC | Age: 73
End: 2023-03-13
Payer: MEDICARE

## 2023-03-13 VITALS
HEIGHT: 62 IN | WEIGHT: 193.13 LBS | DIASTOLIC BLOOD PRESSURE: 100 MMHG | HEART RATE: 70 BPM | OXYGEN SATURATION: 99 % | SYSTOLIC BLOOD PRESSURE: 140 MMHG | BODY MASS INDEX: 35.54 KG/M2

## 2023-03-13 DIAGNOSIS — Z01.818 ENCOUNTER FOR OTHER PREPROCEDURAL EXAMINATION: ICD-10-CM

## 2023-03-13 DIAGNOSIS — Z87.39 PERSONAL HISTORY OF OTHER DISEASES OF THE MUSCULOSKELETAL SYSTEM AND CONNECTIVE TISSUE: ICD-10-CM

## 2023-03-13 DIAGNOSIS — Z91.81 HISTORY OF FALLING: ICD-10-CM

## 2023-03-13 DIAGNOSIS — Z01.812 ENCOUNTER FOR PREPROCEDURAL LABORATORY EXAMINATION: ICD-10-CM

## 2023-03-13 DIAGNOSIS — K06.1 GINGIVAL ENLARGEMENT: ICD-10-CM

## 2023-03-13 DIAGNOSIS — I10 ESSENTIAL (PRIMARY) HYPERTENSION: ICD-10-CM

## 2023-03-13 DIAGNOSIS — R06.02 SHORTNESS OF BREATH: ICD-10-CM

## 2023-03-13 DIAGNOSIS — Z87.898 PERSONAL HISTORY OF OTHER SPECIFIED CONDITIONS: ICD-10-CM

## 2023-03-13 DIAGNOSIS — Z98.84 BARIATRIC SURGERY STATUS: Chronic | ICD-10-CM

## 2023-03-13 DIAGNOSIS — M65.4 RADIAL STYLOID TENOSYNOVITIS [DE QUERVAIN]: ICD-10-CM

## 2023-03-13 DIAGNOSIS — Z98.84 BARIATRIC SURGERY STATUS: ICD-10-CM

## 2023-03-13 DIAGNOSIS — J45.30 MILD PERSISTENT ASTHMA, UNCOMPLICATED: ICD-10-CM

## 2023-03-13 DIAGNOSIS — J45.909 UNSPECIFIED ASTHMA, UNCOMPLICATED: ICD-10-CM

## 2023-03-13 DIAGNOSIS — K21.9 GASTRO-ESOPHAGEAL REFLUX DISEASE WITHOUT ESOPHAGITIS: ICD-10-CM

## 2023-03-13 DIAGNOSIS — R07.89 OTHER CHEST PAIN: ICD-10-CM

## 2023-03-13 DIAGNOSIS — Z20.822 ENCOUNTER FOR PREPROCEDURAL LABORATORY EXAMINATION: ICD-10-CM

## 2023-03-13 DIAGNOSIS — Z86.010 PERSONAL HISTORY OF COLONIC POLYPS: ICD-10-CM

## 2023-03-13 PROCEDURE — 99214 OFFICE O/P EST MOD 30 MIN: CPT | Mod: GC

## 2023-03-13 RX ORDER — ACETAMINOPHEN 500 MG/1
TABLET ORAL
Refills: 0 | Status: DISCONTINUED | COMMUNITY
End: 2023-03-13

## 2023-03-13 RX ORDER — MULTIVIT-MIN/FOLIC/VIT K/LYCOP 400-300MCG
25 MCG TABLET ORAL
Qty: 90 | Refills: 0 | Status: ACTIVE | COMMUNITY
Start: 2021-05-20 | End: 1900-01-01

## 2023-03-13 RX ORDER — POLYETHYLENE GLYCOL 3350 AND ELECTROLYTES WITH LEMON FLAVOR 236; 22.74; 6.74; 5.86; 2.97 G/4L; G/4L; G/4L; G/4L; G/4L
236 POWDER, FOR SOLUTION ORAL
Qty: 1 | Refills: 0 | Status: DISCONTINUED | COMMUNITY
Start: 2022-03-02 | End: 2023-03-13

## 2023-03-13 RX ORDER — FLUTICASONE PROPIONATE 110 UG/1
110 AEROSOL, METERED RESPIRATORY (INHALATION) TWICE DAILY
Qty: 1 | Refills: 2 | Status: ACTIVE | COMMUNITY
Start: 2021-04-28 | End: 1900-01-01

## 2023-03-13 RX ORDER — MELOXICAM 15 MG/1
15 TABLET ORAL
Qty: 30 | Refills: 2 | Status: DISCONTINUED | COMMUNITY
Start: 2022-01-19 | End: 2023-03-13

## 2023-03-13 RX ORDER — METOPROLOL SUCCINATE 50 MG/1
50 TABLET, EXTENDED RELEASE ORAL
Qty: 90 | Refills: 3 | Status: DISCONTINUED | COMMUNITY
Start: 2021-05-20 | End: 2023-03-13

## 2023-03-13 NOTE — HEALTH RISK ASSESSMENT
[Monthly or less (1 pt)] : Monthly or less (1 point) [No falls in past year] : Patient reported no falls in the past year [0] : 2) Feeling down, depressed, or hopeless: Not at all (0)

## 2023-03-29 NOTE — REVIEW OF SYSTEMS
[Fever] : no fever [Chills] : no chills [Fatigue] : no fatigue [Discharge] : no discharge [Pain] : no pain [Itching] : no itching [Earache] : no earache [Hearing Loss] : no hearing loss [Nasal Discharge] : no nasal discharge [Sore Throat] : no sore throat [Chest Pain] : no chest pain [Palpitations] : no palpitations [Leg Claudication] : no leg claudication [Lower Ext Edema] : no lower extremity edema [Shortness Of Breath] : no shortness of breath [Wheezing] : no wheezing [Cough] : no cough [Abdominal Pain] : no abdominal pain [Nausea] : no nausea [Constipation] : no constipation [Vomiting] : no vomiting [Melena] : no melena [Dysuria] : no dysuria [Incontinence] : no incontinence [Hematuria] : no hematuria [Frequency] : no frequency [Joint Pain] : no joint pain [Joint Stiffness] : no joint stiffness [Joint Swelling] : no joint swelling [Muscle Pain] : no muscle pain [Back Pain] : no back pain [Itching] : no itching [Mole Changes] : no mole changes [Nail Changes] : no nail changes [Skin Rash] : no skin rash [Headache] : no headache [Dizziness] : no dizziness [Unsteady Walking] : no ataxia [Memory Loss] : no memory loss [Suicidal] : not suicidal [Insomnia] : no insomnia [Anxiety] : no anxiety [Easy Bruising] : no easy bruising

## 2023-03-29 NOTE — HISTORY OF PRESENT ILLNESS
[FreeTextEntry1] : Follow-up for chronic conditions, HTN, GERD.  [de-identified] : Patient is 71 y/o Nayely-en-Y gastric bypass, cholecystectomy, GERD, TIA, mild persistent asthma, and HTN female who presents to for follow-up. \par \par Today, endorses "bump" on left side of gums. Has been there for 6 years, and states it has been growing since then. Has not seen a dentist for >5 years. Denies pain, discharge. \par \par In regards to her HTN, patient takes labetalol 200 BID and lisinopril-HCTZ 20-12.5 QD at home. BP checks today 140/100. Patient states that she did not take her meds this AM. Has been taking them daily. \par \par Traveling to [place] to see mother with Alzheimers early next month. \par \par Denies CP, SOB, subjective f/c, n/v.

## 2023-03-29 NOTE — PHYSICAL EXAM
[No Acute Distress] : no acute distress [Well Nourished] : well nourished [Well Developed] : well developed [Normal Sclera/Conjunctiva] : normal sclera/conjunctiva [PERRL] : pupils equal round and reactive to light [EOMI] : extraocular movements intact [Normal Outer Ear/Nose] : the outer ears and nose were normal in appearance [No JVD] : no jugular venous distention [No Lymphadenopathy] : no lymphadenopathy [Supple] : supple [No Respiratory Distress] : no respiratory distress  [No Accessory Muscle Use] : no accessory muscle use [Clear to Auscultation] : lungs were clear to auscultation bilaterally [Normal Rate] : normal rate  [Regular Rhythm] : with a regular rhythm [Normal S1, S2] : normal S1 and S2 [No Carotid Bruits] : no carotid bruits [No Abdominal Bruit] : a ~M bruit was not heard ~T in the abdomen [Pedal Pulses Present] : the pedal pulses are present [No Edema] : there was no peripheral edema [No Palpable Aorta] : no palpable aorta [Soft] : abdomen soft [Non Tender] : non-tender [Non-distended] : non-distended [Normal Bowel Sounds] : normal bowel sounds [Normal Posterior Cervical Nodes] : no posterior cervical lymphadenopathy [Normal Anterior Cervical Nodes] : no anterior cervical lymphadenopathy [No CVA Tenderness] : no CVA  tenderness [No Spinal Tenderness] : no spinal tenderness [No Joint Swelling] : no joint swelling [Grossly Normal Strength/Tone] : grossly normal strength/tone [No Rash] : no rash [No Focal Deficits] : no focal deficits [Normal Affect] : the affect was normal [Normal Insight/Judgement] : insight and judgment were intact [de-identified] : Left gum hyperplasia

## 2023-03-29 NOTE — ASSESSMENT
[FreeTextEntry1] : RTC 6 months or earlier as necessary\par \par \par Case discussed with Dr. Curtis. \par \par -Grace Barnes, PGY-3

## 2023-07-18 ENCOUNTER — RX RENEWAL (OUTPATIENT)
Age: 73
End: 2023-07-18

## 2023-09-06 ENCOUNTER — APPOINTMENT (OUTPATIENT)
Dept: INTERNAL MEDICINE | Facility: CLINIC | Age: 73
End: 2023-09-06
Payer: MEDICARE

## 2023-09-06 ENCOUNTER — OUTPATIENT (OUTPATIENT)
Dept: OUTPATIENT SERVICES | Facility: HOSPITAL | Age: 73
LOS: 1 days | End: 2023-09-06

## 2023-09-06 VITALS
WEIGHT: 195 LBS | DIASTOLIC BLOOD PRESSURE: 90 MMHG | HEIGHT: 62 IN | SYSTOLIC BLOOD PRESSURE: 134 MMHG | HEART RATE: 82 BPM | OXYGEN SATURATION: 98 % | BODY MASS INDEX: 35.88 KG/M2

## 2023-09-06 DIAGNOSIS — Z98.84 BARIATRIC SURGERY STATUS: Chronic | ICD-10-CM

## 2023-09-06 DIAGNOSIS — Z23 ENCOUNTER FOR IMMUNIZATION: ICD-10-CM

## 2023-09-06 PROCEDURE — 99214 OFFICE O/P EST MOD 30 MIN: CPT | Mod: GC,25

## 2023-09-06 RX ORDER — LISINOPRIL AND HYDROCHLOROTHIAZIDE TABLETS 20; 12.5 MG/1; MG/1
20-12.5 TABLET ORAL DAILY
Qty: 90 | Refills: 3 | Status: ACTIVE | COMMUNITY
Start: 1900-01-01 | End: 1900-01-01

## 2023-09-06 RX ORDER — LABETALOL HYDROCHLORIDE 200 MG/1
200 TABLET, FILM COATED ORAL
Qty: 120 | Refills: 1 | Status: ACTIVE | COMMUNITY
Start: 2023-03-13 | End: 1900-01-01

## 2023-09-06 NOTE — REVIEW OF SYSTEMS
[Fever] : no fever [Chills] : no chills [Fatigue] : no fatigue [Discharge] : no discharge [Pain] : no pain [Itching] : no itching [Earache] : no earache [Hearing Loss] : no hearing loss [Nasal Discharge] : no nasal discharge [Sore Throat] : no sore throat [Chest Pain] : no chest pain [Palpitations] : no palpitations [Leg Claudication] : no leg claudication [Lower Ext Edema] : no lower extremity edema [Shortness Of Breath] : no shortness of breath [Wheezing] : no wheezing [Cough] : no cough [Abdominal Pain] : no abdominal pain [Nausea] : no nausea [Constipation] : no constipation [Vomiting] : no vomiting [Melena] : no melena [Dysuria] : no dysuria [Incontinence] : no incontinence [Hematuria] : no hematuria [Frequency] : no frequency [Joint Pain] : no joint pain [Joint Stiffness] : no joint stiffness [Joint Swelling] : no joint swelling [Muscle Pain] : no muscle pain [Back Pain] : no back pain [Itching] : no itching [Mole Changes] : no mole changes [Nail Changes] : no nail changes [Skin Rash] : no skin rash [Headache] : no headache [Dizziness] : no dizziness [Memory Loss] : no memory loss [Unsteady Walking] : no ataxia [Suicidal] : not suicidal [Insomnia] : no insomnia [Anxiety] : no anxiety [Easy Bruising] : no easy bruising

## 2023-09-06 NOTE — HISTORY OF PRESENT ILLNESS
[FreeTextEntry1] : Follow-up for CPE visit  [de-identified] : Patient is 74 y/o Nayely-en-Y gastric bypass, cholecystectomy, GERD, TIA, mild persistent asthma, and HTN female who presents to for CPE visit  #Knee pain  her right knee is painful, more swollen, it has been getting worse in the last month, tylenol helped with the pain, she would take 2 tablets when it gets really bad, also reported a knee giving out causing a fall resulting burises in the right wrist without LOC, head trauma. she denied any injury, long distance travelling, sob, has occasional CP every once a while.   In regards to her HTN, patient takes labetalol 200 BID and lisinopril-HCTZ 20-12.5 QD at home. BP checks today 134/90. endorsed occasional headache.   she denied any fever chill, or dysuria.   Denies CP, SOB, subjective f/c, n/v.

## 2023-09-06 NOTE — PHYSICAL EXAM
[No Acute Distress] : no acute distress [Well Nourished] : well nourished [Well Developed] : well developed [Normal Sclera/Conjunctiva] : normal sclera/conjunctiva [PERRL] : pupils equal round and reactive to light [EOMI] : extraocular movements intact [Normal Outer Ear/Nose] : the outer ears and nose were normal in appearance [No JVD] : no jugular venous distention [No Lymphadenopathy] : no lymphadenopathy [Supple] : supple [No Respiratory Distress] : no respiratory distress  [No Accessory Muscle Use] : no accessory muscle use [Clear to Auscultation] : lungs were clear to auscultation bilaterally [Normal Rate] : normal rate  [Regular Rhythm] : with a regular rhythm [Normal S1, S2] : normal S1 and S2 [No Carotid Bruits] : no carotid bruits [No Abdominal Bruit] : a ~M bruit was not heard ~T in the abdomen [Pedal Pulses Present] : the pedal pulses are present [No Edema] : there was no peripheral edema [No Palpable Aorta] : no palpable aorta [Soft] : abdomen soft [Non Tender] : non-tender [Non-distended] : non-distended [Normal Bowel Sounds] : normal bowel sounds [Normal Posterior Cervical Nodes] : no posterior cervical lymphadenopathy [Normal Anterior Cervical Nodes] : no anterior cervical lymphadenopathy [No CVA Tenderness] : no CVA  tenderness [No Spinal Tenderness] : no spinal tenderness [No Joint Swelling] : no joint swelling [Grossly Normal Strength/Tone] : grossly normal strength/tone [No Rash] : no rash [No Focal Deficits] : no focal deficits [Normal Affect] : the affect was normal [Normal Insight/Judgement] : insight and judgment were intact

## 2023-09-07 LAB
ANION GAP SERPL CALC-SCNC: 12 MMOL/L
BUN SERPL-MCNC: 12 MG/DL
CALCIUM SERPL-MCNC: 9.2 MG/DL
CHLORIDE SERPL-SCNC: 106 MMOL/L
CHOLEST SERPL-MCNC: 164 MG/DL
CO2 SERPL-SCNC: 25 MMOL/L
CREAT SERPL-MCNC: 0.71 MG/DL
EGFR: 90 ML/MIN/1.73M2
ESTIMATED AVERAGE GLUCOSE: 108 MG/DL
GLUCOSE SERPL-MCNC: 80 MG/DL
HBA1C MFR BLD HPLC: 5.4 %
HCT VFR BLD CALC: 42.6 %
HDLC SERPL-MCNC: 51 MG/DL
HGB BLD-MCNC: 12.8 G/DL
LDLC SERPL CALC-MCNC: 98 MG/DL
MCHC RBC-ENTMCNC: 27.7 PG
MCHC RBC-ENTMCNC: 30 GM/DL
MCV RBC AUTO: 92.2 FL
NONHDLC SERPL-MCNC: 113 MG/DL
PLATELET # BLD AUTO: 256 K/UL
POTASSIUM SERPL-SCNC: 4.8 MMOL/L
RBC # BLD: 4.62 M/UL
RBC # FLD: 13.8 %
SODIUM SERPL-SCNC: 143 MMOL/L
TRIGL SERPL-MCNC: 79 MG/DL
WBC # FLD AUTO: 4.12 K/UL

## 2023-09-14 DIAGNOSIS — Z00.00 ENCOUNTER FOR GENERAL ADULT MEDICAL EXAMINATION WITHOUT ABNORMAL FINDINGS: ICD-10-CM

## 2023-09-14 DIAGNOSIS — M17.9 OSTEOARTHRITIS OF KNEE, UNSPECIFIED: ICD-10-CM

## 2023-09-14 DIAGNOSIS — I10 ESSENTIAL (PRIMARY) HYPERTENSION: ICD-10-CM

## 2023-09-14 DIAGNOSIS — Z23 ENCOUNTER FOR IMMUNIZATION: ICD-10-CM

## 2023-09-16 ENCOUNTER — APPOINTMENT (OUTPATIENT)
Dept: MAMMOGRAPHY | Facility: IMAGING CENTER | Age: 73
End: 2023-09-16

## 2023-09-16 ENCOUNTER — APPOINTMENT (OUTPATIENT)
Dept: RADIOLOGY | Facility: IMAGING CENTER | Age: 73
End: 2023-09-16

## 2023-11-11 ENCOUNTER — OUTPATIENT (OUTPATIENT)
Dept: OUTPATIENT SERVICES | Facility: HOSPITAL | Age: 73
LOS: 1 days | End: 2023-11-11
Payer: COMMERCIAL

## 2023-11-11 ENCOUNTER — APPOINTMENT (OUTPATIENT)
Dept: MAMMOGRAPHY | Facility: IMAGING CENTER | Age: 73
End: 2023-11-11
Payer: MEDICARE

## 2023-11-11 ENCOUNTER — RESULT REVIEW (OUTPATIENT)
Age: 73
End: 2023-11-11

## 2023-11-11 DIAGNOSIS — Z00.00 ENCOUNTER FOR GENERAL ADULT MEDICAL EXAMINATION WITHOUT ABNORMAL FINDINGS: ICD-10-CM

## 2023-11-11 DIAGNOSIS — Z98.84 BARIATRIC SURGERY STATUS: Chronic | ICD-10-CM

## 2023-11-11 PROCEDURE — 77063 BREAST TOMOSYNTHESIS BI: CPT | Mod: 26

## 2023-11-11 PROCEDURE — 77067 SCR MAMMO BI INCL CAD: CPT | Mod: 26

## 2023-11-11 PROCEDURE — 77067 SCR MAMMO BI INCL CAD: CPT

## 2023-11-11 PROCEDURE — 77063 BREAST TOMOSYNTHESIS BI: CPT

## 2023-11-14 ENCOUNTER — RX RENEWAL (OUTPATIENT)
Age: 73
End: 2023-11-14

## 2024-01-08 NOTE — DISCHARGE NOTE ADULT - NSTOBACCONEVERSMOKERY/N_GEN_A
No [de-identified] : Mild dystrophic gait  Right knee No swelling Mild lateral facet and joint line tenderness Passive range of motion 0 degrees to 125 degrees Ligaments are stable Quad strength 4+/5  Left knee No swelling Mild medial facet and joint line tenderness Passive range of motion 0 degrees to 125 degrees Ligaments are stable Quad strength 4+/5  Both legs No swelling Calves are soft and nontender

## 2024-06-07 ENCOUNTER — OUTPATIENT (OUTPATIENT)
Dept: OUTPATIENT SERVICES | Facility: HOSPITAL | Age: 74
LOS: 1 days | End: 2024-06-07

## 2024-06-07 ENCOUNTER — APPOINTMENT (OUTPATIENT)
Dept: INTERNAL MEDICINE | Facility: CLINIC | Age: 74
End: 2024-06-07
Payer: MEDICARE

## 2024-06-07 VITALS
HEART RATE: 67 BPM | WEIGHT: 190 LBS | OXYGEN SATURATION: 98 % | BODY MASS INDEX: 32.44 KG/M2 | DIASTOLIC BLOOD PRESSURE: 70 MMHG | SYSTOLIC BLOOD PRESSURE: 130 MMHG | HEIGHT: 64 IN

## 2024-06-07 DIAGNOSIS — Z00.00 ENCOUNTER FOR GENERAL ADULT MEDICAL EXAMINATION W/OUT ABNORMAL FINDINGS: ICD-10-CM

## 2024-06-07 DIAGNOSIS — J45.909 UNSPECIFIED ASTHMA, UNCOMPLICATED: ICD-10-CM

## 2024-06-07 DIAGNOSIS — I10 ESSENTIAL (PRIMARY) HYPERTENSION: ICD-10-CM

## 2024-06-07 DIAGNOSIS — M17.9 OSTEOARTHRITIS OF KNEE, UNSPECIFIED: ICD-10-CM

## 2024-06-07 DIAGNOSIS — K21.9 GASTRO-ESOPHAGEAL REFLUX DISEASE W/OUT ESOPHAGITIS: ICD-10-CM

## 2024-06-07 DIAGNOSIS — Z98.84 BARIATRIC SURGERY STATUS: Chronic | ICD-10-CM

## 2024-06-07 PROCEDURE — 99213 OFFICE O/P EST LOW 20 MIN: CPT | Mod: GE

## 2024-06-07 RX ORDER — CELECOXIB 100 MG/1
100 CAPSULE ORAL
Qty: 60 | Refills: 0 | Status: ACTIVE | COMMUNITY
Start: 2024-06-07 | End: 1900-01-01

## 2024-06-07 RX ORDER — BUDESONIDE AND FORMOTEROL FUMARATE DIHYDRATE 160; 4.5 UG/1; UG/1
160-4.5 AEROSOL RESPIRATORY (INHALATION) TWICE DAILY
Qty: 1 | Refills: 3 | Status: ACTIVE | COMMUNITY
Start: 2024-06-07 | End: 1900-01-01

## 2024-06-07 RX ORDER — ALBUTEROL SULFATE 90 UG/1
108 (90 BASE) INHALANT RESPIRATORY (INHALATION)
Qty: 1 | Refills: 3 | Status: DISCONTINUED | COMMUNITY
Start: 2021-04-28 | End: 2024-06-07

## 2024-06-07 RX ORDER — OMEPRAZOLE 20 MG/1
20 CAPSULE, DELAYED RELEASE ORAL DAILY
Qty: 90 | Refills: 1 | Status: DISCONTINUED | COMMUNITY
Start: 2023-11-14 | End: 2024-06-07

## 2024-06-07 RX ORDER — DICLOFENAC SODIUM 3 G/100G
3 GEL TOPICAL TWICE DAILY
Qty: 1 | Refills: 1 | Status: ACTIVE | COMMUNITY
Start: 2023-09-06 | End: 1900-01-01

## 2024-06-07 RX ORDER — PANTOPRAZOLE 40 MG/1
40 TABLET, DELAYED RELEASE ORAL DAILY
Qty: 90 | Refills: 3 | Status: ACTIVE | COMMUNITY
Start: 2024-06-07 | End: 1900-01-01

## 2024-06-10 NOTE — REVIEW OF SYSTEMS
[Shortness Of Breath] : shortness of breath [Wheezing] : wheezing [Abdominal Pain] : abdominal pain [Constipation] : constipation [Heartburn] : heartburn [Negative] : Heme/Lymph [Vomiting] : no vomiting

## 2024-06-10 NOTE — HISTORY OF PRESENT ILLNESS
[FreeTextEntry1] : My stomach still hurts [de-identified] : 73 F w/ Hx chronic GERD, HTN, asthma, knee OA presents for 6 month follow up of chronic issues.   #GERD Endorsing chronic stomach pain for yrs despite daily compliance with omeprazole. Endoscopy in 2022 showed chronic reflux and salmon-colored tissue c/f Parnell's esophagus.   #HTN  Forgets to take BP meds a few times a week, BP in office today 130/70  #Asthma Moderately well controlled, states at times she will use the inhaler at most 2x/week, worsens during spring  #knee OA B/l knee pain, R>L, worsens with use, occasionally takes 2 ibuprofen a few times a week. Did not receive diclofenac gel last time, will resend.

## 2024-06-10 NOTE — ASSESSMENT
[FreeTextEntry1] : 74 y/o F w/ Hx HTN, OA, chronic GERD, asthma here for 6 month follow up. See plan above.   RTC in 3 months for CPE Quin Barrett, PGY-1  Case d/w Dr. Capellan

## 2024-06-10 NOTE — PHYSICAL EXAM
[Normal] : affect was normal and insight and judgment were intact [de-identified] : +mild LLQ tenderness to deep palpation [de-identified] : +Mild R-sided knee edema, not tender to palpation, full ROM

## 2024-06-12 ENCOUNTER — NON-APPOINTMENT (OUTPATIENT)
Age: 74
End: 2024-06-12

## 2024-07-24 ENCOUNTER — APPOINTMENT (OUTPATIENT)
Dept: INTERNAL MEDICINE | Facility: CLINIC | Age: 74
End: 2024-07-24
Payer: MEDICARE

## 2024-07-24 VITALS
WEIGHT: 188 LBS | BODY MASS INDEX: 32.1 KG/M2 | OXYGEN SATURATION: 97 % | HEART RATE: 74 BPM | SYSTOLIC BLOOD PRESSURE: 152 MMHG | DIASTOLIC BLOOD PRESSURE: 88 MMHG | HEIGHT: 64 IN | RESPIRATION RATE: 16 BRPM

## 2024-07-24 DIAGNOSIS — R10.9 UNSPECIFIED ABDOMINAL PAIN: ICD-10-CM

## 2024-07-24 PROCEDURE — 99214 OFFICE O/P EST MOD 30 MIN: CPT | Mod: 25

## 2024-07-25 LAB
ALBUMIN SERPL ELPH-MCNC: 4.4 G/DL
ALP BLD-CCNC: 80 U/L
ALT SERPL-CCNC: 14 U/L
ANION GAP SERPL CALC-SCNC: 11 MMOL/L
AST SERPL-CCNC: 23 U/L
BILIRUB SERPL-MCNC: 0.3 MG/DL
BUN SERPL-MCNC: 11 MG/DL
CALCIUM SERPL-MCNC: 9.4 MG/DL
CHLORIDE SERPL-SCNC: 101 MMOL/L
CO2 SERPL-SCNC: 27 MMOL/L
CREAT SERPL-MCNC: 0.78 MG/DL
EGFR: 80 ML/MIN/1.73M2
GLUCOSE SERPL-MCNC: 91 MG/DL
POTASSIUM SERPL-SCNC: 4.4 MMOL/L
PROT SERPL-MCNC: 7.3 G/DL
SODIUM SERPL-SCNC: 139 MMOL/L

## 2024-07-25 NOTE — ASSESSMENT
[FreeTextEntry1] :  Abdominal pain  - suspect abdominal pain related to constipation, though suspicious for partial obstruction (history of gastric bypass and cholecystectomy) vs infectious process (family member with similar symptoms) - continue with pantoprazole 40mg daily  - will obtain abdominal Xray to evaluate stool burden, r/o obstruction - will check LFTs  - recommended patient obtain covid test  - ordered Dulcolax suppository to help with constipation   RTC as previously scheduled  Case discussed with Dr. Conigliaro - Andrew Flescher, PGY-3

## 2024-07-25 NOTE — END OF VISIT
[] : Resident [FreeTextEntry3] : Pt reports last BM 4 days ago. At that time was had severe pain, and vomiting that day only. Feeling improved now, able to eat and pass gas. No fever or infectious symptoms, no new or unusual foods. On exam, pt in NAD, abdomen is soft with moderate tenderness in LUQ, RUQ and RLQ, some lower fullness but no discrete mass, no Rivera's sign. Pt appears well hydrated. Unclear etiology for pain, may be partial SBO or constipation, would checks labs, x-ray and follow.

## 2024-07-25 NOTE — PHYSICAL EXAM
[No Acute Distress] : no acute distress [Well Nourished] : well nourished [Well Developed] : well developed [Normal Sclera/Conjunctiva] : normal sclera/conjunctiva [PERRL] : pupils equal round and reactive to light [No Respiratory Distress] : no respiratory distress  [No Accessory Muscle Use] : no accessory muscle use [Clear to Auscultation] : lungs were clear to auscultation bilaterally [Coordination Grossly Intact] : coordination grossly intact [No Focal Deficits] : no focal deficits [Soft] : abdomen soft [Non-distended] : non-distended [No Masses] : no abdominal mass palpated [de-identified] : Soft, moderately tender to palpation in the mid-epigastric area, with mild tenderness in LUQ, RUQ, no rebound tenderness, no involuntary guarding, negative rothman

## 2024-07-25 NOTE — PHYSICAL EXAM
[No Acute Distress] : no acute distress [Well Nourished] : well nourished [Well Developed] : well developed [Normal Sclera/Conjunctiva] : normal sclera/conjunctiva [PERRL] : pupils equal round and reactive to light [No Respiratory Distress] : no respiratory distress  [No Accessory Muscle Use] : no accessory muscle use [Clear to Auscultation] : lungs were clear to auscultation bilaterally [Coordination Grossly Intact] : coordination grossly intact [No Focal Deficits] : no focal deficits [Soft] : abdomen soft [Non-distended] : non-distended [No Masses] : no abdominal mass palpated [de-identified] : Soft, moderately tender to palpation in the mid-epigastric area, with mild tenderness in LUQ, RUQ, no rebound tenderness, no involuntary guarding, negative rothman

## 2024-07-25 NOTE — REVIEW OF SYSTEMS
[Abdominal Pain] : abdominal pain [Nausea] : nausea [Constipation] : constipation [Vomiting] : vomiting [Heartburn] : heartburn [Fever] : no fever [Chills] : no chills [Chest Pain] : no chest pain [Palpitations] : no palpitations [Lower Ext Edema] : no lower extremity edema [Shortness Of Breath] : no shortness of breath [Wheezing] : no wheezing [Cough] : no cough [Dyspnea on Exertion] : no dyspnea on exertion [Dysuria] : no dysuria [Frequency] : no frequency [Joint Pain] : no joint pain [Joint Stiffness] : no joint stiffness [Muscle Pain] : no muscle pain [Back Pain] : no back pain [Headache] : no headache [Dizziness] : no dizziness [Fainting] : no fainting

## 2024-07-25 NOTE — HISTORY OF PRESENT ILLNESS
[FreeTextEntry8] : 74 Female with PMH significant for, chronic GERD, HTN, asthma, and knee OA who presents for abdominal pain. Patient states that the pain is a 7/10 in severity and is located primary in the mid-epigastric area. Patient states the pain alternative between sharp and crampy pain. Patient states that the pain will move through the abdomen. Patient states that 3 days prior to Sunday she was unable to have a bowel movement and was having some mid-epigastric pain. Patient attempted to drink a bottle mag citrate, which resulted in nausea and vomiting. Since Sunday, patient continues to have not have a BM, but reports passing flatulence. Patient states that over the last 2-3 days, she has not had much appetite and when she was eating, she would vomit a few hours later. Patient denies any hematemesis. Patient states that today was the first day she was able to tolerate food without vomiting. Patient has had no difficulty tolerating liquids. Patient denies any fever, chills, chest pain, shortness of breath, weakness, or diarrhea. Of note, patient reports that her  has been having similar symptoms.

## 2024-07-27 ENCOUNTER — EMERGENCY (EMERGENCY)
Facility: HOSPITAL | Age: 74
LOS: 1 days | Discharge: HOME | End: 2024-07-27
Attending: EMERGENCY MEDICINE | Admitting: EMERGENCY MEDICINE
Payer: MEDICARE

## 2024-07-27 VITALS
RESPIRATION RATE: 16 BRPM | SYSTOLIC BLOOD PRESSURE: 137 MMHG | TEMPERATURE: 98 F | HEART RATE: 67 BPM | OXYGEN SATURATION: 100 % | DIASTOLIC BLOOD PRESSURE: 87 MMHG

## 2024-07-27 VITALS
OXYGEN SATURATION: 96 % | SYSTOLIC BLOOD PRESSURE: 182 MMHG | DIASTOLIC BLOOD PRESSURE: 94 MMHG | WEIGHT: 182.1 LBS | TEMPERATURE: 98 F | HEIGHT: 64 IN | RESPIRATION RATE: 17 BRPM | HEART RATE: 64 BPM

## 2024-07-27 DIAGNOSIS — Z98.84 BARIATRIC SURGERY STATUS: Chronic | ICD-10-CM

## 2024-07-27 PROCEDURE — 99284 EMERGENCY DEPT VISIT MOD MDM: CPT

## 2024-07-27 RX ORDER — DIAZEPAM 10 MG/1
5 TABLET ORAL ONCE
Refills: 0 | Status: DISCONTINUED | OUTPATIENT
Start: 2024-07-27 | End: 2024-07-27

## 2024-07-27 RX ORDER — SENNOSIDES 8.6 MG
1 TABLET ORAL ONCE
Refills: 0 | Status: COMPLETED | OUTPATIENT
Start: 2024-07-27 | End: 2024-07-27

## 2024-07-27 RX ORDER — DIAZEPAM 10 MG/1
1 TABLET ORAL
Qty: 8 | Refills: 0
Start: 2024-07-27 | End: 2024-07-28

## 2024-07-27 RX ORDER — LIDOCAINE HCL 28 MG/G
1 GEL TOPICAL ONCE
Refills: 0 | Status: COMPLETED | OUTPATIENT
Start: 2024-07-27 | End: 2024-07-27

## 2024-07-27 RX ORDER — ACETAMINOPHEN 325 MG
1000 TABLET ORAL ONCE
Refills: 0 | Status: COMPLETED | OUTPATIENT
Start: 2024-07-27 | End: 2024-07-27

## 2024-07-27 RX ADMIN — Medication 600 MILLIGRAM(S): at 15:45

## 2024-07-27 RX ADMIN — LIDOCAINE HCL 1 PATCH: 28 GEL TOPICAL at 15:48

## 2024-07-27 RX ADMIN — Medication 1000 MILLIGRAM(S): at 15:45

## 2024-07-27 RX ADMIN — DIAZEPAM 5 MILLIGRAM(S): 10 TABLET ORAL at 15:45

## 2024-07-27 RX ADMIN — Medication 1 TABLET(S): at 15:45

## 2024-07-27 NOTE — ED PROVIDER NOTE - ATTENDING CONTRIBUTION TO CARE
Patient with history of hypertension p/w stiff neck x3 days. States 3 days ago she woke up with stiffness and has not improved, took 1 dose of the medication from a family member but does not recall the name and states it helped.  Has not taken any ibuprofen or Tylenol.  Denies any numbness, tingling, weakness or traumatic injuries.  She does not have any midline cervical spinal tenderness but she does have some tenderness to palpation along bilateral proximal trapezius muscles.  She can range her neck but has some restriction 2/2 pain in b/l trapezius distribution.  No neurological deficits on exam, suspect stiff neck, will treat with NSAIDs, Valium, reassess.

## 2024-07-27 NOTE — ED PROVIDER NOTE - CLINICAL SUMMARY MEDICAL DECISION MAKING FREE TEXT BOX
74-year-old female history of hypertension, gastric bypass, presents with 3 days of cervical neck tenderness that began upon awakening.  States worsened with movement.  Improves with pain medication.  Denies recent falls or trauma, vision changes, headaches, chest pain, shortness of breath, abdominal pain, nausea, vomiting.    VSS.  Diffuse paraspinal cervical tenderness to palpation with decreased range of motion secondary to pain. Likely muscle strain.  Will provide pain control, reassess need for further workup.

## 2024-07-27 NOTE — ED ADULT NURSE NOTE - OBJECTIVE STATEMENT
Alaina RN: Patient A&o X4, history of HTN, received in spot 3a, with complaints of neck pain. Patient states, "I have been having neck pain for the past three days, I woke up with this pain". Patient reports that neck pain is 8 out of 10 currently, admits to taking OTC meds prior to arrival but unsure of name. Patient denies any recent falls or trauma to neck. Patient has limited ROM in neck, no further neurological deficits noted. Patient also complains of feeling constipated, reports last BM was this morning. Patient able to speak in clear sentences, respirations equal and unlabored. Lung sounds clear b/l, equal chest rise and fall noted. Denies CP/SOB, fever, chills, nausea, vomiting and diarrhea at this time. Skin warm and dry. Provider at bedside for eval, pending further orders.

## 2024-07-27 NOTE — ED PROVIDER NOTE - PATIENT PORTAL LINK FT
You can access the FollowMyHealth Patient Portal offered by [facility] by registering at the following website: [email or web address]. By joining [facility]’s FollowMyHealth portal, you will also be able to view your health information using other applications (apps) compatible with our system.

## 2024-07-27 NOTE — ED ADULT NURSE NOTE - NSFALLUNIVINTERV_ED_ALL_ED
Bed/Stretcher in lowest position, wheels locked, appropriate side rails in place/Call bell, personal items and telephone in reach/Instruct patient to call for assistance before getting out of bed/chair/stretcher/Non-slip footwear applied when patient is off stretcher/[place] to call system/Physically safe environment - no spills, clutter or unnecessary equipment/Purposeful proactive rounding/Room/bathroom lighting operational, light cord in reach

## 2024-07-27 NOTE — ED ADULT TRIAGE NOTE - CHIEF COMPLAINT QUOTE
patient c/o stiff neck x3 days. patient states she was trying to take OTC medication at home however with no relief. denies any injury to area, patient states she woke up like this. past medical history of hypertension, gastric bypass

## 2024-07-27 NOTE — ED PROVIDER NOTE - NSFOLLOWUPINSTRUCTIONS_ED_ALL_ED_FT
You were seen in the ED for neck pain.    You can use 500-1000mg Tylenol every 6 hours for pain - as needed.  This is an over-the-counter medications - please respect the warnings on the label. This medication come with certain risks and side effects that you need to discuss with your doctor, especially if you are taking it for a prolonged period.    You can use 400-600mg Ibuprofen (such as motrin or advil) every 6 to 8 hours as needed for pain control.  Take ibuprofen with food or milk to lessen stomach upset.  This is an over-the-counter medication please respect the warnings on the label. All medications come with certain risks and side effects that you need to discuss with your doctor, especially if you are taking them for a prolonged period.    Please buy Salonpas Lidocaine patches over the counter at the pharmacy. Use as directed on packaging.    Valium was also sent to your pharmacy. Please take as needed for severe spasms. Do not drink alcohol or operate heavy machinery with this medication. Return to the ED for new or worsening symptoms.

## 2024-07-27 NOTE — ED PROVIDER NOTE - PHYSICAL EXAMINATION
GEN: NAD. A&Ox3. Non-toxic appearing.  HEENT: normocephalic, atraumatic, EOMI, PERRL, no scleral icterus, no conjuntival pallor, moist MM  CARDIAC: RRR, S1, S2, no murmur. Radial pulses and DP pulses present and symmetric b/l  PULM: CTA B/L no wheeze, rhonchi, rales.   ABD: soft NT, ND, no rebound no guarding  MSK: diffuse cervical paraspinal ttp, minimal ROM 2/2 pain   NEURO: gait normal, no focal neurological deficits, CN 2-12 grossly intact  SKIN: warm, dry, no rash.

## 2024-07-27 NOTE — ED ADULT NURSE REASSESSMENT NOTE - NS ED NURSE REASSESS COMMENT FT1
Break RN: pt remains a&ox4, c/o mild 2/10 left shoulder pain. Pt endorses relief to right shoulder pain. Pt denies chest pain, sob, numbness, tingling. Breathing even, unlabored. Pending dispo. Safety maintained.

## 2024-09-23 ENCOUNTER — OUTPATIENT (OUTPATIENT)
Dept: OUTPATIENT SERVICES | Facility: HOSPITAL | Age: 74
LOS: 1 days | End: 2024-09-23

## 2024-09-23 ENCOUNTER — APPOINTMENT (OUTPATIENT)
Dept: INTERNAL MEDICINE | Facility: CLINIC | Age: 74
End: 2024-09-23

## 2024-09-23 VITALS
HEIGHT: 64 IN | OXYGEN SATURATION: 98 % | HEART RATE: 75 BPM | DIASTOLIC BLOOD PRESSURE: 84 MMHG | SYSTOLIC BLOOD PRESSURE: 142 MMHG | WEIGHT: 188 LBS | BODY MASS INDEX: 32.1 KG/M2

## 2024-09-23 DIAGNOSIS — I10 ESSENTIAL (PRIMARY) HYPERTENSION: ICD-10-CM

## 2024-09-23 DIAGNOSIS — Z00.00 ENCOUNTER FOR GENERAL ADULT MEDICAL EXAMINATION W/OUT ABNORMAL FINDINGS: ICD-10-CM

## 2024-09-23 DIAGNOSIS — K21.9 GASTRO-ESOPHAGEAL REFLUX DISEASE W/OUT ESOPHAGITIS: ICD-10-CM

## 2024-09-23 DIAGNOSIS — M75.82 OTHER SHOULDER LESIONS, LEFT SHOULDER: ICD-10-CM

## 2024-09-23 DIAGNOSIS — J45.909 UNSPECIFIED ASTHMA, UNCOMPLICATED: ICD-10-CM

## 2024-09-23 PROCEDURE — 99214 OFFICE O/P EST MOD 30 MIN: CPT | Mod: GC

## 2024-09-23 RX ORDER — ALBUTEROL SULFATE 90 UG/1
108 (90 BASE) INHALANT RESPIRATORY (INHALATION)
Qty: 1 | Refills: 1 | Status: ACTIVE | COMMUNITY
Start: 2024-09-23 | End: 1900-01-01

## 2024-09-23 RX ORDER — AMLODIPINE BESYLATE 5 MG/1
5 TABLET ORAL
Qty: 30 | Refills: 2 | Status: ACTIVE | COMMUNITY
Start: 2024-09-23 | End: 1900-01-01

## 2024-09-23 NOTE — HISTORY OF PRESENT ILLNESS
[FreeTextEntry1] : Follow Up  [de-identified] : 74 year old female with PMH significant for, chronic GERD, HTN, asthma, and knee OA who presents for follow up of abdominal pain.   #Abdominal Pain  Last visit, patient had an acute visit for abdominal pain. Patient states that pain has now resolved and is back to her chronic GERD pain. Patient endorses bowel movements about every 2 days, denies any blood per rectum. She denies taking any medications for a bowel regimen.  #Left shoulder pain  Patient reports pain to the left shoulder. She reports using voltaren gel and taking voltaren pills by mouth that she got in [place].  #Memory issues  Patient reports concern about increasing forgetfulness. She denies any issues with long term memory but states she will sometimes forget if she took medications in the morning or what she ate yesterday. Patient goes to [place] to take care of her mother with Alzheimer's.  #Asthma  Patient states she has two inhalers at home - Symbicort and Albuterol. Patient states she last used an inhaler about 3 weeks ago, does not recall which one. Patient states her asthma usually worsens when she goes to [place] because of the humidity.  #HTN Patient endorses taking lisinopril-hctz and labetalol. However patient states she only takes labetalol once a day and sometimes will miss doses of both medications. Patient does not check blood pressure at home.

## 2024-09-23 NOTE — REVIEW OF SYSTEMS
[Abdominal Pain] : abdominal pain [Constipation] : constipation [Joint Pain] : joint pain [Fever] : no fever [Chills] : no chills [Discharge] : no discharge [Vision Problems] : no vision problems [Earache] : no earache [Nasal Discharge] : no nasal discharge [Sore Throat] : no sore throat [Chest Pain] : no chest pain [Palpitations] : no palpitations [Shortness Of Breath] : no shortness of breath [Wheezing] : no wheezing [Nausea] : no nausea [Vomiting] : no vomiting [Dysuria] : no dysuria [Hematuria] : no hematuria [Muscle Pain] : no muscle pain [Skin Rash] : no skin rash

## 2024-09-23 NOTE — PHYSICAL EXAM
[No Acute Distress] : no acute distress [Well Nourished] : well nourished [Well Developed] : well developed [Normal Sclera/Conjunctiva] : normal sclera/conjunctiva [PERRL] : pupils equal round and reactive to light [EOMI] : extraocular movements intact [Normal Outer Ear/Nose] : the outer ears and nose were normal in appearance [No JVD] : no jugular venous distention [No Respiratory Distress] : no respiratory distress  [Clear to Auscultation] : lungs were clear to auscultation bilaterally [Normal Rate] : normal rate  [Regular Rhythm] : with a regular rhythm [No Edema] : there was no peripheral edema [Soft] : abdomen soft [Non Tender] : non-tender [Non-distended] : non-distended [No CVA Tenderness] : no CVA  tenderness [No Rash] : no rash

## 2024-09-23 NOTE — ASSESSMENT
[FreeTextEntry1] : Case discussed with Dr. Painting  RTC in 5 weeks for follow up  Andrzej Poole  Internal Medicine PGY-2 [employer] [facility]

## 2024-09-27 LAB
25(OH)D3 SERPL-MCNC: 28.2 NG/ML
CA-I SERPL-SCNC: 4.9 MG/DL
FOLATE SERPL-MCNC: >20 NG/ML
VIT B12 SERPL-MCNC: 536 PG/ML

## 2024-10-07 DIAGNOSIS — I10 ESSENTIAL (PRIMARY) HYPERTENSION: ICD-10-CM

## 2024-10-07 DIAGNOSIS — J45.909 UNSPECIFIED ASTHMA, UNCOMPLICATED: ICD-10-CM

## 2024-10-07 DIAGNOSIS — K21.9 GASTRO-ESOPHAGEAL REFLUX DISEASE WITHOUT ESOPHAGITIS: ICD-10-CM

## 2024-10-07 DIAGNOSIS — M75.82 OTHER SHOULDER LESIONS, LEFT SHOULDER: ICD-10-CM

## 2024-10-30 ENCOUNTER — APPOINTMENT (OUTPATIENT)
Dept: INTERNAL MEDICINE | Facility: CLINIC | Age: 74
End: 2024-10-30

## 2024-11-08 ENCOUNTER — OUTPATIENT (OUTPATIENT)
Dept: OUTPATIENT SERVICES | Facility: HOSPITAL | Age: 74
LOS: 1 days | End: 2024-11-08

## 2024-11-08 ENCOUNTER — APPOINTMENT (OUTPATIENT)
Dept: INTERNAL MEDICINE | Facility: CLINIC | Age: 74
End: 2024-11-08
Payer: MEDICARE

## 2024-11-08 VITALS
SYSTOLIC BLOOD PRESSURE: 156 MMHG | DIASTOLIC BLOOD PRESSURE: 81 MMHG | BODY MASS INDEX: 31.76 KG/M2 | HEIGHT: 64 IN | HEART RATE: 62 BPM | OXYGEN SATURATION: 99 % | WEIGHT: 186 LBS

## 2024-11-08 DIAGNOSIS — K21.9 GASTRO-ESOPHAGEAL REFLUX DISEASE W/OUT ESOPHAGITIS: ICD-10-CM

## 2024-11-08 DIAGNOSIS — M75.82 OTHER SHOULDER LESIONS, LEFT SHOULDER: ICD-10-CM

## 2024-11-08 DIAGNOSIS — T78.40XA ALLERGY, UNSPECIFIED, INITIAL ENCOUNTER: ICD-10-CM

## 2024-11-08 DIAGNOSIS — I10 ESSENTIAL (PRIMARY) HYPERTENSION: ICD-10-CM

## 2024-11-08 DIAGNOSIS — Z98.84 BARIATRIC SURGERY STATUS: Chronic | ICD-10-CM

## 2024-11-08 PROCEDURE — 99213 OFFICE O/P EST LOW 20 MIN: CPT

## 2024-11-08 RX ORDER — OMEPRAZOLE 40 MG/1
40 CAPSULE, DELAYED RELEASE ORAL
Qty: 30 | Refills: 3 | Status: ACTIVE | COMMUNITY
Start: 2024-11-08 | End: 1900-01-01

## 2024-11-08 RX ORDER — LORATADINE 10 MG/1
10 TABLET ORAL DAILY
Qty: 30 | Refills: 0 | Status: ACTIVE | COMMUNITY
Start: 2024-11-08 | End: 1900-01-01

## 2024-11-11 DIAGNOSIS — M75.82 OTHER SHOULDER LESIONS, LEFT SHOULDER: ICD-10-CM

## 2024-11-11 DIAGNOSIS — T78.40XA ALLERGY, UNSPECIFIED, INITIAL ENCOUNTER: ICD-10-CM

## 2024-11-11 DIAGNOSIS — K21.9 GASTRO-ESOPHAGEAL REFLUX DISEASE WITHOUT ESOPHAGITIS: ICD-10-CM

## 2024-11-11 DIAGNOSIS — I10 ESSENTIAL (PRIMARY) HYPERTENSION: ICD-10-CM

## 2025-04-24 ENCOUNTER — APPOINTMENT (OUTPATIENT)
Dept: INTERNAL MEDICINE | Facility: CLINIC | Age: 75
End: 2025-04-24

## 2025-05-14 ENCOUNTER — NON-APPOINTMENT (OUTPATIENT)
Age: 75
End: 2025-05-14

## 2025-05-16 ENCOUNTER — OUTPATIENT (OUTPATIENT)
Dept: OUTPATIENT SERVICES | Facility: HOSPITAL | Age: 75
LOS: 1 days | End: 2025-05-16

## 2025-05-16 ENCOUNTER — APPOINTMENT (OUTPATIENT)
Dept: INTERNAL MEDICINE | Facility: CLINIC | Age: 75
End: 2025-05-16
Payer: MEDICARE

## 2025-05-16 VITALS
SYSTOLIC BLOOD PRESSURE: 173 MMHG | TEMPERATURE: 97.2 F | HEART RATE: 65 BPM | HEIGHT: 64 IN | BODY MASS INDEX: 31.67 KG/M2 | WEIGHT: 185.5 LBS | DIASTOLIC BLOOD PRESSURE: 99 MMHG | OXYGEN SATURATION: 99 %

## 2025-05-16 VITALS — SYSTOLIC BLOOD PRESSURE: 156 MMHG | DIASTOLIC BLOOD PRESSURE: 86 MMHG

## 2025-05-16 DIAGNOSIS — I10 ESSENTIAL (PRIMARY) HYPERTENSION: ICD-10-CM

## 2025-05-16 DIAGNOSIS — Z98.84 BARIATRIC SURGERY STATUS: Chronic | ICD-10-CM

## 2025-05-16 PROCEDURE — G2211 COMPLEX E/M VISIT ADD ON: CPT

## 2025-05-16 PROCEDURE — 99213 OFFICE O/P EST LOW 20 MIN: CPT

## 2025-05-16 RX ORDER — LIDOCAINE 4% 40 MG/G
4 PATCH TRANSDERMAL
Qty: 20 | Refills: 0 | Status: ACTIVE | COMMUNITY
Start: 2025-05-16 | End: 1900-01-01

## 2025-07-01 ENCOUNTER — APPOINTMENT (OUTPATIENT)
Dept: INTERNAL MEDICINE | Facility: CLINIC | Age: 75
End: 2025-07-01

## 2025-09-11 ENCOUNTER — APPOINTMENT (OUTPATIENT)
Dept: INTERNAL MEDICINE | Facility: CLINIC | Age: 75
End: 2025-09-11
Payer: MEDICARE

## 2025-09-11 VITALS
SYSTOLIC BLOOD PRESSURE: 158 MMHG | WEIGHT: 183 LBS | DIASTOLIC BLOOD PRESSURE: 80 MMHG | HEIGHT: 64 IN | BODY MASS INDEX: 31.24 KG/M2 | RESPIRATION RATE: 16 BRPM | HEART RATE: 77 BPM | OXYGEN SATURATION: 100 %

## 2025-09-11 DIAGNOSIS — Z00.00 ENCOUNTER FOR GENERAL ADULT MEDICAL EXAMINATION W/OUT ABNORMAL FINDINGS: ICD-10-CM

## 2025-09-11 DIAGNOSIS — T78.40XA ALLERGY, UNSPECIFIED, INITIAL ENCOUNTER: ICD-10-CM

## 2025-09-11 DIAGNOSIS — M54.9 DORSALGIA, UNSPECIFIED: ICD-10-CM

## 2025-09-11 DIAGNOSIS — Z23 ENCOUNTER FOR IMMUNIZATION: ICD-10-CM

## 2025-09-11 DIAGNOSIS — Z86.73 PERSONAL HISTORY OF TRANSIENT ISCHEMIC ATTACK (TIA), AND CEREBRAL INFARCTION W/OUT RESIDUAL DEFICITS: ICD-10-CM

## 2025-09-11 DIAGNOSIS — K21.9 GASTRO-ESOPHAGEAL REFLUX DISEASE W/OUT ESOPHAGITIS: ICD-10-CM

## 2025-09-11 DIAGNOSIS — J45.909 UNSPECIFIED ASTHMA, UNCOMPLICATED: ICD-10-CM

## 2025-09-11 DIAGNOSIS — I10 ESSENTIAL (PRIMARY) HYPERTENSION: ICD-10-CM

## 2025-09-11 DIAGNOSIS — M85.80 OTHER SPECIFIED DISORDERS OF BONE DENSITY AND STRUCTURE, UNSPECIFIED SITE: ICD-10-CM

## 2025-09-11 DIAGNOSIS — M25.532 PAIN IN LEFT WRIST: ICD-10-CM

## 2025-09-11 PROCEDURE — 99204 OFFICE O/P NEW MOD 45 MIN: CPT | Mod: GC,25

## 2025-09-11 RX ORDER — CYCLOBENZAPRINE HYDROCHLORIDE 5 MG/1
5 TABLET, FILM COATED ORAL
Qty: 15 | Refills: 0 | Status: ACTIVE | COMMUNITY
Start: 2025-09-11 | End: 1900-01-01

## 2025-09-11 RX ORDER — EPINEPHRINE 0.3 MG/.3ML
0.3 INJECTION INTRAMUSCULAR
Qty: 2 | Refills: 0 | Status: ACTIVE | COMMUNITY
Start: 2025-09-11 | End: 1900-01-01

## 2025-09-11 RX ORDER — ASPIRIN 81 MG/1
81 TABLET, DELAYED RELEASE ORAL
Qty: 90 | Refills: 0 | Status: ACTIVE | COMMUNITY
Start: 2025-09-11 | End: 1900-01-01

## 2025-09-11 RX ORDER — MELOXICAM 10 MG/1
10 CAPSULE ORAL DAILY
Qty: 10 | Refills: 0 | Status: ACTIVE | COMMUNITY
Start: 2025-09-11 | End: 1900-01-01

## 2025-09-12 LAB
25(OH)D3 SERPL-MCNC: 46.3 NG/ML
ALBUMIN SERPL ELPH-MCNC: 4.3 G/DL
ALP BLD-CCNC: 80 U/L
ALT SERPL-CCNC: 18 U/L
ANION GAP SERPL CALC-SCNC: 12 MMOL/L
AST SERPL-CCNC: 30 U/L
BASOPHILS # BLD AUTO: 0.03 K/UL
BASOPHILS NFR BLD AUTO: 0.6 %
BILIRUB SERPL-MCNC: 0.3 MG/DL
BUN SERPL-MCNC: 11 MG/DL
CALCIUM SERPL-MCNC: 9.5 MG/DL
CHLORIDE SERPL-SCNC: 103 MMOL/L
CO2 SERPL-SCNC: 25 MMOL/L
CREAT SERPL-MCNC: 0.74 MG/DL
EGFRCR SERPLBLD CKD-EPI 2021: 85 ML/MIN/1.73M2
EOSINOPHIL # BLD AUTO: 0.05 K/UL
EOSINOPHIL NFR BLD AUTO: 1 %
ESTIMATED AVERAGE GLUCOSE: 108 MG/DL
GLUCOSE SERPL-MCNC: 79 MG/DL
HBA1C MFR BLD HPLC: 5.4 %
HCT VFR BLD CALC: 43.2 %
HGB BLD-MCNC: 12.9 G/DL
IMM GRANULOCYTES NFR BLD AUTO: 0 %
LYMPHOCYTES # BLD AUTO: 1.91 K/UL
LYMPHOCYTES NFR BLD AUTO: 39.5 %
MAN DIFF?: NORMAL
MCHC RBC-ENTMCNC: 27.2 PG
MCHC RBC-ENTMCNC: 29.9 G/DL
MCV RBC AUTO: 91.1 FL
MONOCYTES # BLD AUTO: 0.26 K/UL
MONOCYTES NFR BLD AUTO: 5.4 %
NEUTROPHILS # BLD AUTO: 2.58 K/UL
NEUTROPHILS NFR BLD AUTO: 53.5 %
PLATELET # BLD AUTO: 296 K/UL
POTASSIUM SERPL-SCNC: 4.6 MMOL/L
PROT SERPL-MCNC: 7.3 G/DL
RBC # BLD: 4.74 M/UL
RBC # FLD: 14 %
SODIUM SERPL-SCNC: 141 MMOL/L
WBC # FLD AUTO: 4.83 K/UL